# Patient Record
Sex: FEMALE | Race: WHITE | NOT HISPANIC OR LATINO
[De-identification: names, ages, dates, MRNs, and addresses within clinical notes are randomized per-mention and may not be internally consistent; named-entity substitution may affect disease eponyms.]

---

## 2018-02-09 ENCOUNTER — TRANSCRIPTION ENCOUNTER (OUTPATIENT)
Age: 43
End: 2018-02-09

## 2018-02-09 PROBLEM — Z00.00 ENCOUNTER FOR PREVENTIVE HEALTH EXAMINATION: Status: ACTIVE | Noted: 2018-02-09

## 2018-02-11 ENCOUNTER — OUTPATIENT (OUTPATIENT)
Dept: OUTPATIENT SERVICES | Facility: HOSPITAL | Age: 43
LOS: 1 days | End: 2018-02-11
Payer: COMMERCIAL

## 2018-02-11 ENCOUNTER — APPOINTMENT (OUTPATIENT)
Dept: MRI IMAGING | Facility: CLINIC | Age: 43
End: 2018-02-11
Payer: COMMERCIAL

## 2018-02-11 DIAGNOSIS — Z00.8 ENCOUNTER FOR OTHER GENERAL EXAMINATION: ICD-10-CM

## 2018-02-11 PROCEDURE — 72141 MRI NECK SPINE W/O DYE: CPT | Mod: 26

## 2018-02-11 PROCEDURE — 72141 MRI NECK SPINE W/O DYE: CPT

## 2018-09-26 ENCOUNTER — MESSAGE (OUTPATIENT)
Age: 43
End: 2018-09-26

## 2018-09-28 ENCOUNTER — APPOINTMENT (OUTPATIENT)
Dept: ORTHOPEDIC SURGERY | Facility: CLINIC | Age: 43
End: 2018-09-28
Payer: COMMERCIAL

## 2018-09-28 VITALS
WEIGHT: 128 LBS | BODY MASS INDEX: 21.85 KG/M2 | DIASTOLIC BLOOD PRESSURE: 71 MMHG | SYSTOLIC BLOOD PRESSURE: 116 MMHG | HEART RATE: 80 BPM | HEIGHT: 64 IN

## 2018-09-28 DIAGNOSIS — Z86.79 PERSONAL HISTORY OF OTHER DISEASES OF THE CIRCULATORY SYSTEM: ICD-10-CM

## 2018-09-28 DIAGNOSIS — Z82.61 FAMILY HISTORY OF ARTHRITIS: ICD-10-CM

## 2018-09-28 DIAGNOSIS — Z78.9 OTHER SPECIFIED HEALTH STATUS: ICD-10-CM

## 2018-09-28 PROCEDURE — 72040 X-RAY EXAM NECK SPINE 2-3 VW: CPT

## 2018-09-28 PROCEDURE — 99204 OFFICE O/P NEW MOD 45 MIN: CPT

## 2018-09-29 PROBLEM — Z82.61 FAMILY HISTORY OF ARTHRITIS: Status: ACTIVE | Noted: 2018-09-28

## 2020-03-02 ENCOUNTER — APPOINTMENT (OUTPATIENT)
Dept: ORTHOPEDIC SURGERY | Facility: CLINIC | Age: 45
End: 2020-03-02
Payer: COMMERCIAL

## 2020-03-02 VITALS
HEIGHT: 64 IN | BODY MASS INDEX: 21.85 KG/M2 | DIASTOLIC BLOOD PRESSURE: 80 MMHG | WEIGHT: 128 LBS | SYSTOLIC BLOOD PRESSURE: 127 MMHG | HEART RATE: 87 BPM

## 2020-03-02 PROCEDURE — 72040 X-RAY EXAM NECK SPINE 2-3 VW: CPT | Mod: 26

## 2020-03-02 PROCEDURE — 20552 NJX 1/MLT TRIGGER POINT 1/2: CPT

## 2020-03-02 PROCEDURE — 99214 OFFICE O/P EST MOD 30 MIN: CPT | Mod: 25

## 2020-03-02 RX ORDER — BUTALBITAL, ACETAMINOPHEN AND CAFFEINE 325; 50; 40 MG/1; MG/1; MG/1
50-325-40 TABLET ORAL
Qty: 10 | Refills: 0 | Status: ACTIVE | COMMUNITY
Start: 2019-12-23

## 2020-03-02 RX ORDER — RIZATRIPTAN BENZOATE 5 MG/1
5 TABLET, ORALLY DISINTEGRATING ORAL
Qty: 9 | Refills: 0 | Status: ACTIVE | COMMUNITY
Start: 2019-12-21

## 2020-03-02 RX ORDER — CYCLOBENZAPRINE HYDROCHLORIDE 5 MG/1
5 TABLET, FILM COATED ORAL
Qty: 30 | Refills: 0 | Status: ACTIVE | COMMUNITY
Start: 2019-06-29

## 2020-03-02 RX ORDER — NICOTINE POLACRILEX 4 MG
250 GUM BUCCAL
Qty: 30 | Refills: 0 | Status: ACTIVE | COMMUNITY
Start: 2019-12-21

## 2020-03-02 NOTE — HISTORY OF PRESENT ILLNESS
[de-identified] : 43yo F presents with acute exacerbation of cervicalgia with radiating pain to the LUE.  She reports the pain began 5 days ago sharp aching radiates into the left sided chest and axilla.  She reports having a hx SVT and had evaluation recently but not since having this pulling type pain to the left ACW.  Neck ROM worsens pain.  no significant relief with rest. She reports having numbness and tingling to the LUE.  She has been taking motrin intermittently.  She continues doing YOGA without significant relief.  She reports having weakness to the left UE.  She has been taking cyclobenzaprine without any relief.  [Pain Location] : pain [Worsening] : worsening [___ days] : [unfilled] day(s) ago [7] : a current pain level of 7/10

## 2020-03-02 NOTE — PHYSICAL EXAM
[de-identified] : CONSTITUTIONAL:  Patient is a very pleasant individual who is well-nourished and appears stated age.  \par \par PSYCHIATRIC:  Alert and oriented times three and in no apparent distress.\par \par HEENT:  Atraumatic and  nonsyndromic in appearance.\par \par RESPIRATORY:  Respiratory rate is regular, not dyspneic on examination.\par \par LYMPHATICS:  There is no cervical or axillary lymphadenopathy.\par  \par INTEGUMENTARY:  Skin is clean, dry, and intact about the bilateral upper extremities and cervical spine.  \par \par VASCULAR:   There is brisk capillary refill about the bilateral upper extremities and radial pulses are 2/4.  \par \par NEUROLOGIC:  Negative L'hirmitte sign. There are no pathologic reflexes. There is no decrease in sensation of the bilateral upper extremities on Wartenberg pinwheel examination.  Deep tendon reflexes are well-maintained at +2/4 of the bilateral upper extremities and are symmetric.\par \par MUSCULOSKELETAL:  There is no visible muscular atrophy.  Manual motor strength is well maintained in the bilateral upper extremities.  Cervical range of motion is well maintained.  The patient ambulates in a non-myelopathic manner.  Normal secondary orthopaedic exam of bilateral shoulders, elbows and hands.  Elbow flexion and extension, wrist extention, finger flexion and abduction are well maintained. TTP Left sided trapezius and r cervical paraspinals with reproducible pain\par \par  [de-identified] : cervical xray today with loss of cervical lordosis and cervical spondylosis c5-C6, c6-c7

## 2020-03-02 NOTE — PROCEDURE
[de-identified] : Application of trigger point injection: procedural consent of the patient prior to injection.\par The left trapezius region was prepped with Chloroprep  and anesthetize with ethyl chloride 10 mg of Depo-Medrol (methylprednisolone) & 1 1/2 mL of 1% plain lidocaine and Ketorolac 15mg  IM injection given without complication.\par The patient tolerated this well

## 2020-03-02 NOTE — DISCUSSION/SUMMARY
[de-identified] : 43yo F with acute on chronic cervicalgia, myofascial pain, cervical spondylosis.  She was provided with a trigger point injection today with depomedrol/ketorolac and lidocaine.  She was recommended to initiate conservative management with physical therapy for core strengthening modalities, decreased pain modalities and medical massage. offered  prescription of Medrol dose pack-declined, naproxen 500 p.o. q.12 p.r.n. pain. Discussed continue with home exercise/stretching to strengthen core such as yoga Pilates swimming walking.  MRI of cervical spine to evaluate for discogenic cause of LUE radiculopathy.  In regards to pain into the ACW she was recommended to f/u with PCP/cardiologist given her hx of SVT.    Followup in 3-4 weeks for repeat clinical assessment and MRI review.\par

## 2020-03-03 RX ORDER — NAPROXEN 500 MG/1
500 TABLET ORAL
Qty: 60 | Refills: 1 | Status: ACTIVE | COMMUNITY
Start: 2020-03-03 | End: 1900-01-01

## 2020-03-05 RX ORDER — METHYLPREDNISOLONE 4 MG/1
4 TABLET ORAL
Qty: 1 | Refills: 0 | Status: ACTIVE | COMMUNITY
Start: 2020-03-05 | End: 1900-01-01

## 2020-03-10 ENCOUNTER — APPOINTMENT (OUTPATIENT)
Dept: ORTHOPEDIC SURGERY | Facility: CLINIC | Age: 45
End: 2020-03-10
Payer: COMMERCIAL

## 2020-03-10 VITALS
HEIGHT: 64 IN | BODY MASS INDEX: 21.85 KG/M2 | HEART RATE: 86 BPM | DIASTOLIC BLOOD PRESSURE: 74 MMHG | WEIGHT: 128 LBS | SYSTOLIC BLOOD PRESSURE: 132 MMHG

## 2020-03-10 PROCEDURE — 99214 OFFICE O/P EST MOD 30 MIN: CPT

## 2020-03-10 NOTE — DISCUSSION/SUMMARY
[de-identified] : 43yo F with acute on chronic cervicalgia, myofascial pain, cervical spondylosis.  Patient feels as if she has exhausted conservative care and has been dealing with neck symptoms since 2012.  Feels her cervical radiculopathy is getting worse, along with her weakness in her left upper extremity. She also wishes to add in the cervical level of C4-C5 in an effort to decrease adjacent level disease and avoid more surgery. She is clearly aware that adding an additional level will not eliminate additional surgery but will postpone it. She wishes to pursue scheduling a C4-C7 ACDF. CT scans will be ordered in preparation for surgery. \par \par A long discussion was had with the patient regarding Cervical surgical plan of C4-C7 ACDF. Anatomic models, Xrays, CT scans/MRI’s were utilized to provide a firm understanding of their surgical plan. Patient is aware that surgery is elective in nature and he/she choosing to proceed with surgery. Risks, benefits, alternatives were discussed and all questions, comments and concerns were encouraged and answered to the patient's satisfaction. The statistical probability of improvement was discussed at length as well as post surgical course. Literature from [employer] spine society was provided to the patient regarding the specific type of surgery as well as a 5 page written surgical consent which the patient will need to sign and return to the office prior to surgical date. Consent forms highlight specific complications related to the complex nature of spinal surgery.\par  \par Risks of cervical surgery include: dysphagia/difficulty swallowing, Dysphonia/altered voice, adjacent segment disease (which will require more surgery in the future), vascular compromise and stroke, and persistent pain.\par  \par Benefits of cervical surgery include Improved neurologic function and pain score\par  \par We also discussed with the patient complications of incisions directly related to obesity, diabetes, previous wound complications or post-surgical wound infections, smoking, neuropathy, and chronic anticoagulation. This risk has been specifically discussed and the patient will discuss modifiable risk factors to be optimized prior to surgical management. A multimodality approach of primary care physician, and medicine subspecialists will be utilized to optimize medical risk factors.\par  \par If patient is a smoker, discontinuation of smoking was advised and must be accomplished 6-8 weeks prior to surgery date. Patient was advised that help with quitting smoking is available through [facility] Smoker's Quit [facility] and phone number/website was provided, or patient can ask assistance from primary care provider. Elective surgery will not be performed unless patient complies with this policy.\par

## 2020-03-10 NOTE — PHYSICAL EXAM
[de-identified] : CONSTITUTIONAL:  Patient is a very pleasant individual who is well-nourished and appears stated age. \par PSYCHIATRIC:  Alert and oriented times three and in no apparent distress, and participates with orthopedic evaluation well.\par HEAD:  Atraumatic and  nonsyndromic in appearance.\par EENT: No thyromegaly, EOMI.\par RESPIRATORY:  Respiratory rate is regular, not dyspneic on examination.\par LYMPHATICS:  There is no cervical or axillary lymphadenopathy.\par INTEGUMENTARY:  Skin is clean, dry, and intact about the bilateral upper extremities and cervical spine. \par VASCULAR:   There is brisk capillary refill about the bilateral upper extremities and radial pulses are 2/4. \par NEUROLOGIC:  Negative L'hirmitte, negative Spurling’s sign. There are no pathologic reflexes. There is no decrease in sensation of the bilateral upper extremities on Wartenberg pinwheel examination.  Deep tendon reflexes are well-maintained at +2/4 of the bilateral upper extremities and are symmetric.\par MUSCULOSKELETAL:  There is no visible muscular atrophy. Cervical range of motion is well maintained. The patient ambulates in a non-myelopathic manner. Normal secondary orthopaedic exam of bilateral shoulders, elbows and hands.  Elbow flexion and extension, wrist extension, finger flexion and abduction are well maintained.\par  \par  progressive motor deficits in her left triceps, sensory deficit at C6-C7/cervical radiculopathy \par  [de-identified] : MRI from [facility], cervical spine: severe C5-C6, and C6-C7 cervical spondylosis with prominent left foraminal disc herniation, also demonstrating C4-C5 DDD.\par \par Xray of the cervical spine taken today shows focal cervical kyphosis from C4-C7

## 2020-03-10 NOTE — HISTORY OF PRESENT ILLNESS
[Pain Location] : pain [Worsening] : worsening [___ days] : [unfilled] day(s) ago [7] : a current pain level of 7/10 [de-identified] : 44 year old female presents for cervical spine pain and MRI  results. She reports her left upper extremity weakness and pain has worsened at this time. Anti-inflammatory steroids and trigger point injections did not improve her pain or symptoms. Patient also feels as if she is suffering side effective of NSAIDs such as gastric upset.

## 2020-03-10 NOTE — ADDENDUM
[FreeTextEntry1] : Documented by Carissa Mackey acting as a scribe for Dr. Facundo Morley on 03/10/2020. \par \par All medical record entries made by the Scribe were at my, Dr. Facundo Morley, direction and personally dictated by me on 03/10/2020. I have reviewed the chart and agree that the record accurately reflects my personal performance of the history, physical exam, assessment and plan. I have also personally directed, reviewed, and agreed with the chart.

## 2020-03-11 RX ORDER — LIDOCAINE 5% 700 MG/1
5 PATCH TOPICAL
Qty: 5 | Refills: 0 | Status: ACTIVE | COMMUNITY
Start: 2020-03-11 | End: 1900-01-01

## 2020-03-13 RX ORDER — TRAMADOL HYDROCHLORIDE 50 MG/1
50 TABLET, COATED ORAL
Qty: 40 | Refills: 0 | Status: DISCONTINUED | COMMUNITY
Start: 2020-03-13 | End: 2020-03-13

## 2020-03-13 RX ORDER — AZITHROMYCIN 250 MG/1
250 TABLET, FILM COATED ORAL
Qty: 6 | Refills: 0 | Status: ACTIVE | COMMUNITY
Start: 2019-11-27

## 2020-03-13 RX ORDER — AMOXICILLIN AND CLAVULANATE POTASSIUM 875; 125 MG/1; MG/1
875-125 TABLET, COATED ORAL
Qty: 14 | Refills: 0 | Status: ACTIVE | COMMUNITY
Start: 2020-01-23

## 2020-03-13 RX ORDER — BENZONATATE 100 MG/1
100 CAPSULE ORAL
Qty: 20 | Refills: 0 | Status: ACTIVE | COMMUNITY
Start: 2019-11-27

## 2020-03-13 RX ORDER — TRAMADOL HYDROCHLORIDE AND ACETAMINOPHEN 37.5; 325 MG/1; MG/1
37.5-325 TABLET, FILM COATED ORAL
Qty: 15 | Refills: 0 | Status: DISCONTINUED | COMMUNITY
Start: 2020-03-11 | End: 2020-03-13

## 2020-03-13 RX ORDER — TRAMADOL HYDROCHLORIDE 50 MG/1
50 TABLET, COATED ORAL
Qty: 42 | Refills: 0 | Status: ACTIVE | COMMUNITY
Start: 2020-03-13 | End: 1900-01-01

## 2020-03-13 RX ORDER — OSELTAMIVIR PHOSPHATE 75 MG/1
75 CAPSULE ORAL
Qty: 10 | Refills: 0 | Status: ACTIVE | COMMUNITY
Start: 2020-01-06

## 2020-03-13 RX ORDER — FLUTICASONE PROPIONATE 50 UG/1
50 SPRAY, METERED NASAL
Qty: 16 | Refills: 0 | Status: ACTIVE | COMMUNITY
Start: 2020-01-23

## 2020-03-13 RX ORDER — METRONIDAZOLE 7.5 MG/G
0.75 GEL VAGINAL
Qty: 70 | Refills: 0 | Status: ACTIVE | COMMUNITY
Start: 2019-10-22

## 2020-03-13 RX ORDER — SUMATRIPTAN 25 MG/1
25 TABLET, FILM COATED ORAL
Qty: 9 | Refills: 0 | Status: ACTIVE | COMMUNITY
Start: 2019-11-19

## 2020-03-24 ENCOUNTER — APPOINTMENT (OUTPATIENT)
Dept: ORTHOPEDIC SURGERY | Facility: CLINIC | Age: 45
End: 2020-03-24

## 2020-03-27 ENCOUNTER — OUTPATIENT (OUTPATIENT)
Dept: OUTPATIENT SERVICES | Facility: HOSPITAL | Age: 45
LOS: 1 days | End: 2020-03-27
Payer: COMMERCIAL

## 2020-03-27 ENCOUNTER — APPOINTMENT (OUTPATIENT)
Dept: ORTHOPEDIC SURGERY | Facility: CLINIC | Age: 45
End: 2020-03-27

## 2020-03-27 VITALS
DIASTOLIC BLOOD PRESSURE: 69 MMHG | SYSTOLIC BLOOD PRESSURE: 126 MMHG | WEIGHT: 129.85 LBS | TEMPERATURE: 98 F | RESPIRATION RATE: 18 BRPM | HEART RATE: 96 BPM | HEIGHT: 64 IN

## 2020-03-27 DIAGNOSIS — I47.1 SUPRAVENTRICULAR TACHYCARDIA: ICD-10-CM

## 2020-03-27 DIAGNOSIS — Z29.9 ENCOUNTER FOR PROPHYLACTIC MEASURES, UNSPECIFIED: ICD-10-CM

## 2020-03-27 DIAGNOSIS — M54.12 RADICULOPATHY, CERVICAL REGION: ICD-10-CM

## 2020-03-27 DIAGNOSIS — Z01.818 ENCOUNTER FOR OTHER PREPROCEDURAL EXAMINATION: ICD-10-CM

## 2020-03-27 DIAGNOSIS — Z90.710 ACQUIRED ABSENCE OF BOTH CERVIX AND UTERUS: Chronic | ICD-10-CM

## 2020-03-27 LAB
ANION GAP SERPL CALC-SCNC: 13 MMOL/L — SIGNIFICANT CHANGE UP (ref 5–17)
APTT BLD: 29.5 SEC — SIGNIFICANT CHANGE UP (ref 27.5–36.3)
BASOPHILS # BLD AUTO: 0.03 K/UL — SIGNIFICANT CHANGE UP (ref 0–0.2)
BASOPHILS NFR BLD AUTO: 0.7 % — SIGNIFICANT CHANGE UP (ref 0–2)
BLD GP AB SCN SERPL QL: SIGNIFICANT CHANGE UP
BUN SERPL-MCNC: 7 MG/DL — LOW (ref 8–20)
CALCIUM SERPL-MCNC: 9.7 MG/DL — SIGNIFICANT CHANGE UP (ref 8.6–10.2)
CHLORIDE SERPL-SCNC: 100 MMOL/L — SIGNIFICANT CHANGE UP (ref 98–107)
CO2 SERPL-SCNC: 27 MMOL/L — SIGNIFICANT CHANGE UP (ref 22–29)
CREAT SERPL-MCNC: 0.49 MG/DL — LOW (ref 0.5–1.3)
EOSINOPHIL # BLD AUTO: 0.08 K/UL — SIGNIFICANT CHANGE UP (ref 0–0.5)
EOSINOPHIL NFR BLD AUTO: 2 % — SIGNIFICANT CHANGE UP (ref 0–6)
GLUCOSE SERPL-MCNC: 97 MG/DL — SIGNIFICANT CHANGE UP (ref 70–99)
HBA1C BLD-MCNC: 5.2 % — SIGNIFICANT CHANGE UP (ref 4–5.6)
HCT VFR BLD CALC: 40.6 % — SIGNIFICANT CHANGE UP (ref 34.5–45)
HGB BLD-MCNC: 14.3 G/DL — SIGNIFICANT CHANGE UP (ref 11.5–15.5)
IMM GRANULOCYTES NFR BLD AUTO: 0 % — SIGNIFICANT CHANGE UP (ref 0–1.5)
INR BLD: 1.04 RATIO — SIGNIFICANT CHANGE UP (ref 0.88–1.16)
LYMPHOCYTES # BLD AUTO: 1.1 K/UL — SIGNIFICANT CHANGE UP (ref 1–3.3)
LYMPHOCYTES # BLD AUTO: 27.4 % — SIGNIFICANT CHANGE UP (ref 13–44)
MCHC RBC-ENTMCNC: 33.2 PG — SIGNIFICANT CHANGE UP (ref 27–34)
MCHC RBC-ENTMCNC: 35.2 GM/DL — SIGNIFICANT CHANGE UP (ref 32–36)
MCV RBC AUTO: 94.2 FL — SIGNIFICANT CHANGE UP (ref 80–100)
MONOCYTES # BLD AUTO: 0.28 K/UL — SIGNIFICANT CHANGE UP (ref 0–0.9)
MONOCYTES NFR BLD AUTO: 7 % — SIGNIFICANT CHANGE UP (ref 2–14)
MRSA PCR RESULT.: SIGNIFICANT CHANGE UP
NEUTROPHILS # BLD AUTO: 2.53 K/UL — SIGNIFICANT CHANGE UP (ref 1.8–7.4)
NEUTROPHILS NFR BLD AUTO: 62.9 % — SIGNIFICANT CHANGE UP (ref 43–77)
PLATELET # BLD AUTO: 271 K/UL — SIGNIFICANT CHANGE UP (ref 150–400)
POTASSIUM SERPL-MCNC: 4.3 MMOL/L — SIGNIFICANT CHANGE UP (ref 3.5–5.3)
POTASSIUM SERPL-SCNC: 4.3 MMOL/L — SIGNIFICANT CHANGE UP (ref 3.5–5.3)
PROTHROM AB SERPL-ACNC: 11.8 SEC — SIGNIFICANT CHANGE UP (ref 10–12.9)
RBC # BLD: 4.31 M/UL — SIGNIFICANT CHANGE UP (ref 3.8–5.2)
RBC # FLD: 11.2 % — SIGNIFICANT CHANGE UP (ref 10.3–14.5)
S AUREUS DNA NOSE QL NAA+PROBE: SIGNIFICANT CHANGE UP
SODIUM SERPL-SCNC: 140 MMOL/L — SIGNIFICANT CHANGE UP (ref 135–145)
WBC # BLD: 4.02 K/UL — SIGNIFICANT CHANGE UP (ref 3.8–10.5)
WBC # FLD AUTO: 4.02 K/UL — SIGNIFICANT CHANGE UP (ref 3.8–10.5)

## 2020-03-27 PROCEDURE — 86850 RBC ANTIBODY SCREEN: CPT

## 2020-03-27 PROCEDURE — G0463: CPT

## 2020-03-27 PROCEDURE — 80048 BASIC METABOLIC PNL TOTAL CA: CPT

## 2020-03-27 PROCEDURE — 85610 PROTHROMBIN TIME: CPT

## 2020-03-27 PROCEDURE — 87640 STAPH A DNA AMP PROBE: CPT

## 2020-03-27 PROCEDURE — 83036 HEMOGLOBIN GLYCOSYLATED A1C: CPT

## 2020-03-27 PROCEDURE — 86901 BLOOD TYPING SEROLOGIC RH(D): CPT

## 2020-03-27 PROCEDURE — 93010 ELECTROCARDIOGRAM REPORT: CPT

## 2020-03-27 PROCEDURE — 87641 MR-STAPH DNA AMP PROBE: CPT

## 2020-03-27 PROCEDURE — 85730 THROMBOPLASTIN TIME PARTIAL: CPT

## 2020-03-27 PROCEDURE — 86900 BLOOD TYPING SEROLOGIC ABO: CPT

## 2020-03-27 PROCEDURE — 36415 COLL VENOUS BLD VENIPUNCTURE: CPT

## 2020-03-27 PROCEDURE — 85027 COMPLETE CBC AUTOMATED: CPT

## 2020-03-27 PROCEDURE — 93005 ELECTROCARDIOGRAM TRACING: CPT

## 2020-03-27 NOTE — PATIENT PROFILE ADULT - NSPROEDALEARNPREF_GEN_A_NUR
individual instruction/written material/Pre op teaching surgical scrub pain management instructions given to pt/verbal instruction

## 2020-03-27 NOTE — H&P PST ADULT - HISTORY OF PRESENT ILLNESS
44 yr old female presents with c/o pain to left shoulder left arm and occasional numbness and tingling to left hand.  Pt is right hand dominant . 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 Over the past month pain has been worse, repeat MRI done early march and surgery recommended herniation c4-c7 as per pt. Took a steroid pack for 1 week with no relief and occasional tylenol and cyclobenzaprine with little relief.

## 2020-03-27 NOTE — H&P PST ADULT - NSICDXPROBLEM_GEN_ALL_CORE_FT
PROBLEM DIAGNOSES  Problem: SVT (supraventricular tachycardia)  Assessment and Plan: cardiac clearance Dr. Chu     Problem: Cervical radiculopathy  Assessment and Plan: anterior cervical discectomy and fusion c4-c5 c5-c6 c6-c7  with DR. Morley PROBLEM DIAGNOSES  Problem: Need for prophylactic measure  Assessment and Plan: caprini score 3   surgical team assess need for dvt prophylaxis     Problem: SVT (supraventricular tachycardia)  Assessment and Plan: cardiac clearance Dr. Chu   medical clearance DR. Baum    Problem: Cervical radiculopathy  Assessment and Plan: anterior cervical discectomy and fusion c4-c5 c5-c6 c6-c7  with DR. Morley

## 2020-03-27 NOTE — H&P PST ADULT - ASSESSMENT
Pleasant 44 yr old female in NAD  presents with history of herniation to cervical spine with c/o left arm and shoulder pain . aPt scheduled for cervical fusion c5-c7 with DR. Morley .   OPIOID RISK TOOL    DURGA EACH BOX THAT APPLIES AND ADD TOTALS AT THE END    FAMILY HISTORY OF SUBSTANCE ABUSE                 FEMALE         MALE                                                Alcohol                             [  ]1 pt          [  ]3pts                                               Illegal Durgs                     [  ]2 pts        [  ]3pts                                               Rx Drugs                           [  ]4 pts        [  ]4 pts    PERSONAL HISTORY OF SUBSTANCE ABUSE                                                                                          Alcohol                             [  ]3 pts       [  ]3 pts                                               Illegal Durgs                     [  ]4 pts        [  ]4 pts                                               Rx Drugs                           [  ]5 pts        [  ]5 pts    AGE BETWEEN 16-45 YEARS                                      [ X ]1 pt         [  ]1 pt    HISTORY OF PREADOLESCENT   SEXUAL ABUSE                                                             [  ]3 pts        [  ]0pts    PSYCHOLOGICAL DISEASE                     ADD, OCD, Bipolar, Schizophrenia        [  ]2 pts         [  ]2 pts                      Depression                                               [  ]1 pt           [  ]1 pt           SCORING TOTAL   (add numbers and type here)              (*1**)                                     A score of 3 or lower indicated LOW risk for future opiod abuse  A score of 4 to 7 indicated moderate risk for future opiod abuse  A score of 8 or higher indicates a high risk for opiod abuse  CAPRINI VTE 2.0 SCORE [CLOT updated 2019]    AGE RELATED RISK FACTORS                                                       MOBILITY RELATED FACTORS  [X ] Age 41-60 years                                            (1 Point)                    [ ] Bed rest                                                        (1 Point)  [ ] Age: 61-74 years                                           (2 Points)                  [ ] Plaster cast                                                   (2 Points)  [ ] Age= 75 years                                              (3 Points)                    [ ] Bed bound for more than 72 hours                 (2 Points)    DISEASE RELATED RISK FACTORS                                               GENDER SPECIFIC FACTORS  [ ] Edema in the lower extremities                       (1 Point)              [ ] Pregnancy                                                     (1 Point)  [ ] Varicose veins                                               (1 Point)                     [ ] Post-partum < 6 weeks                                   (1 Point)             [ ] BMI > 25 Kg/m2                                            (1 Point)                     [ ] Hormonal therapy  or oral contraception          (1 Point)                 [ ] Sepsis (in the previous month)                        (1 Point)               [ ] History of pregnancy complications                 (1 point)  [ ] Pneumonia or serious lung disease                                               [ ] Unexplained or recurrent                     (1 Point)           (in the previous month)                               (1 Point)  [ ] Abnormal pulmonary function test                     (1 Point)                 SURGERY RELATED RISK FACTORS  [ ] Acute myocardial infarction                              (1 Point)               [ ]  Section                                             (1 Point)  [ ] Congestive heart failure (in the previous month)  (1 Point)      [ ] Minor surgery                                                  (1 Point)   [ ] Inflammatory bowel disease                             (1 Point)               [ ] Arthroscopic surgery                                        (2 Points)  [ ] Central venous access                                      (2 Points)                [x ] General surgery lasting more than 45 minutes (2 points)  [ ] Malignancy- Present or previous                   (2 Points)                [ ] Elective arthroplasty                                         (5 points)    [ ] Stroke (in the previous month)                          (5 Points)                                                                                                                                                           HEMATOLOGY RELATED FACTORS                                                 TRAUMA RELATED RISK FACTORS  [ ] Prior episodes of VTE                                     (3 Points)                [ ] Fracture of the hip, pelvis, or leg                       (5 Points)  [ ] Positive family history for VTE                         (3 Points)             [ ] Acute spinal cord injury (in the previous month)  (5 Points)  [ ] Prothrombin 26253 A                                     (3 Points)               [ ] Paralysis  (less than 1 month)                             (5 Points)  [ ] Factor V Leiden                                             (3 Points)                  [ ] Multiple Trauma within 1 month                        (5 Points)  [ ] Lupus anticoagulants                                     (3 Points)                                                           [ ] Anticardiolipin antibodies                               (3 Points)                                                       [ ] High homocysteine in the blood                      (3 Points)                                             [ ] Other congenital or acquired thrombophilia      (3 Points)                                                [ ] Heparin induced thrombocytopenia                  (3 Points)                                     Total Score [   3       ]

## 2020-03-27 NOTE — H&P PST ADULT - EKG AND INTERPRETATION
nsr [id number] with sinus arrythmia   biatrial enlargement   pending final interpretation   faxed to DR. Chu

## 2020-03-27 NOTE — PATIENT PROFILE ADULT - VISION (WITH CORRECTIVE LENSES IF THE PATIENT USUALLY WEARS THEM):
Partially impaired: cannot see medication labels or newsprint, but can see obstacles in path, and the surrounding layout; can count fingers at arm's length/Driving glasses

## 2020-03-27 NOTE — H&P PST ADULT - NSICDXPASTMEDICALHX_GEN_ALL_CORE_FT
PAST MEDICAL HISTORY:  Cervical spondylosis     History of radiculopathy cervical    SVT (supraventricular tachycardia) follows with DR. Cannon PAST MEDICAL HISTORY:  Cervical spondylosis     History of radiculopathy cervical    SVT (supraventricular tachycardia) follows with DR. Davis Sanchez

## 2020-03-27 NOTE — H&P PST ADULT - MUSCULOSKELETAL COMMENTS
left back shoulder , neck and left arm pain  cervical herniations cervical  herniation  left arm pain

## 2020-04-01 ENCOUNTER — NON-APPOINTMENT (OUTPATIENT)
Age: 45
End: 2020-04-01

## 2020-04-08 ENCOUNTER — APPOINTMENT (OUTPATIENT)
Dept: ORTHOPEDIC SURGERY | Facility: HOSPITAL | Age: 45
End: 2020-04-08

## 2020-04-17 ENCOUNTER — APPOINTMENT (OUTPATIENT)
Dept: ORTHOPEDIC SURGERY | Facility: CLINIC | Age: 45
End: 2020-04-17

## 2020-05-20 ENCOUNTER — OUTPATIENT (OUTPATIENT)
Dept: OUTPATIENT SERVICES | Facility: HOSPITAL | Age: 45
LOS: 1 days | End: 2020-05-20
Payer: COMMERCIAL

## 2020-05-20 VITALS
SYSTOLIC BLOOD PRESSURE: 118 MMHG | HEIGHT: 65 IN | RESPIRATION RATE: 18 BRPM | HEART RATE: 96 BPM | WEIGHT: 131.18 LBS | DIASTOLIC BLOOD PRESSURE: 69 MMHG | TEMPERATURE: 98 F

## 2020-05-20 DIAGNOSIS — Z01.818 ENCOUNTER FOR OTHER PREPROCEDURAL EXAMINATION: ICD-10-CM

## 2020-05-20 DIAGNOSIS — Z29.9 ENCOUNTER FOR PROPHYLACTIC MEASURES, UNSPECIFIED: ICD-10-CM

## 2020-05-20 DIAGNOSIS — M54.12 RADICULOPATHY, CERVICAL REGION: ICD-10-CM

## 2020-05-20 DIAGNOSIS — Z90.710 ACQUIRED ABSENCE OF BOTH CERVIX AND UTERUS: Chronic | ICD-10-CM

## 2020-05-20 DIAGNOSIS — I47.1 SUPRAVENTRICULAR TACHYCARDIA: ICD-10-CM

## 2020-05-20 PROBLEM — Z86.69 PERSONAL HISTORY OF OTHER DISEASES OF THE NERVOUS SYSTEM AND SENSE ORGANS: Chronic | Status: ACTIVE | Noted: 2020-03-27

## 2020-05-20 PROBLEM — M47.812 SPONDYLOSIS WITHOUT MYELOPATHY OR RADICULOPATHY, CERVICAL REGION: Chronic | Status: ACTIVE | Noted: 2020-03-27

## 2020-05-20 LAB
ANION GAP SERPL CALC-SCNC: 11 MMOL/L — SIGNIFICANT CHANGE UP (ref 5–17)
APTT BLD: 31.3 SEC — SIGNIFICANT CHANGE UP (ref 27.5–36.3)
BASOPHILS # BLD AUTO: 0.03 K/UL — SIGNIFICANT CHANGE UP (ref 0–0.2)
BASOPHILS NFR BLD AUTO: 0.5 % — SIGNIFICANT CHANGE UP (ref 0–2)
BLD GP AB SCN SERPL QL: SIGNIFICANT CHANGE UP
BUN SERPL-MCNC: 5 MG/DL — LOW (ref 8–20)
CALCIUM SERPL-MCNC: 9.1 MG/DL — SIGNIFICANT CHANGE UP (ref 8.6–10.2)
CHLORIDE SERPL-SCNC: 102 MMOL/L — SIGNIFICANT CHANGE UP (ref 98–107)
CO2 SERPL-SCNC: 27 MMOL/L — SIGNIFICANT CHANGE UP (ref 22–29)
CREAT SERPL-MCNC: 0.45 MG/DL — LOW (ref 0.5–1.3)
EOSINOPHIL # BLD AUTO: 0.05 K/UL — SIGNIFICANT CHANGE UP (ref 0–0.5)
EOSINOPHIL NFR BLD AUTO: 0.9 % — SIGNIFICANT CHANGE UP (ref 0–6)
GLUCOSE SERPL-MCNC: 93 MG/DL — SIGNIFICANT CHANGE UP (ref 70–99)
HCT VFR BLD CALC: 41.5 % — SIGNIFICANT CHANGE UP (ref 34.5–45)
HGB BLD-MCNC: 14.4 G/DL — SIGNIFICANT CHANGE UP (ref 11.5–15.5)
IMM GRANULOCYTES NFR BLD AUTO: 0.2 % — SIGNIFICANT CHANGE UP (ref 0–1.5)
INR BLD: 1.05 RATIO — SIGNIFICANT CHANGE UP (ref 0.88–1.16)
LYMPHOCYTES # BLD AUTO: 1.36 K/UL — SIGNIFICANT CHANGE UP (ref 1–3.3)
LYMPHOCYTES # BLD AUTO: 23.5 % — SIGNIFICANT CHANGE UP (ref 13–44)
MCHC RBC-ENTMCNC: 32.9 PG — SIGNIFICANT CHANGE UP (ref 27–34)
MCHC RBC-ENTMCNC: 34.7 GM/DL — SIGNIFICANT CHANGE UP (ref 32–36)
MCV RBC AUTO: 94.7 FL — SIGNIFICANT CHANGE UP (ref 80–100)
MONOCYTES # BLD AUTO: 0.29 K/UL — SIGNIFICANT CHANGE UP (ref 0–0.9)
MONOCYTES NFR BLD AUTO: 5 % — SIGNIFICANT CHANGE UP (ref 2–14)
MRSA PCR RESULT.: SIGNIFICANT CHANGE UP
NEUTROPHILS # BLD AUTO: 4.04 K/UL — SIGNIFICANT CHANGE UP (ref 1.8–7.4)
NEUTROPHILS NFR BLD AUTO: 69.9 % — SIGNIFICANT CHANGE UP (ref 43–77)
PLATELET # BLD AUTO: 338 K/UL — SIGNIFICANT CHANGE UP (ref 150–400)
POTASSIUM SERPL-MCNC: 4.1 MMOL/L — SIGNIFICANT CHANGE UP (ref 3.5–5.3)
POTASSIUM SERPL-SCNC: 4.1 MMOL/L — SIGNIFICANT CHANGE UP (ref 3.5–5.3)
PROTHROM AB SERPL-ACNC: 11.9 SEC — SIGNIFICANT CHANGE UP (ref 10–12.9)
RBC # BLD: 4.38 M/UL — SIGNIFICANT CHANGE UP (ref 3.8–5.2)
RBC # FLD: 11.6 % — SIGNIFICANT CHANGE UP (ref 10.3–14.5)
S AUREUS DNA NOSE QL NAA+PROBE: SIGNIFICANT CHANGE UP
SODIUM SERPL-SCNC: 140 MMOL/L — SIGNIFICANT CHANGE UP (ref 135–145)
WBC # BLD: 5.78 K/UL — SIGNIFICANT CHANGE UP (ref 3.8–10.5)
WBC # FLD AUTO: 5.78 K/UL — SIGNIFICANT CHANGE UP (ref 3.8–10.5)

## 2020-05-20 PROCEDURE — G0463: CPT

## 2020-05-20 NOTE — H&P PST ADULT - NSICDXPROBLEM_GEN_ALL_CORE_FT
PROBLEM DIAGNOSES  Problem: Radiculopathy, cervical region  Assessment and Plan: Anterior cervical discectomy and fusion C4-C5, C5-C6,C6-C7. F/U with PCP for medical clearance     Problem: SVT (supraventricular tachycardia)  Assessment and Plan: f/u with cardiologist     Problem: Need for prophylactic measure  Assessment and Plan: Moderate risk surgical team to determine prophylactic intervention

## 2020-05-20 NOTE — H&P PST ADULT - ASSESSMENT
45 y/o female presents with c/o pain to left shoulder left arm and occasional numbness and tingling to left hand.  Pt is right hand dominant, pt report progression of disease with worsen symptoms of left hand tingling, numbness, weakness and pain. Pain relieved by rest, NSAID and cyclobenzaprine. Report prior conventional treatment of physical therapy, chiropractice and steroid injections with fair efficacy with initial treatment and now pain stopped responding to conventional treatment. Seen today for a scheduled surgery with Dr. Morley. Surgery protocol reviewed with pt today. Pt to follow-up with PCP and cardiologist for clearances  CAPRINI VTE 2.0 SCORE [CLOT updated 2019]    AGE RELATED RISK FACTORS                                                       MOBILITY RELATED FACTORS  [x ] Age 41-60 years                                            (1 Point)                    [ ] Bed rest                                                        (1 Point)  [ ] Age: 61-74 years                                           (2 Points)                  [ ] Plaster cast                                                   (2 Points)  [ ] Age= 75 years                                              (3 Points)                    [ ] Bed bound for more than 72 hours                 (2 Points)    DISEASE RELATED RISK FACTORS                                               GENDER SPECIFIC FACTORS  [ ] Edema in the lower extremities                       (1 Point)              [ ] Pregnancy                                                     (1 Point)  [ ] Varicose veins                                               (1 Point)                     [ ] Post-partum < 6 weeks                                   (1 Point)             [ ] BMI > 25 Kg/m2                                            (1 Point)                     [ ] Hormonal therapy  or oral contraception          (1 Point)                 [ ] Sepsis (in the previous month)                        (1 Point)               [ ] History of pregnancy complications                 (1 point)  [ ] Pneumonia or serious lung disease                                               [ ] Unexplained or recurrent                     (1 Point)           (in the previous month)                               (1 Point)  [ ] Abnormal pulmonary function test                     (1 Point)                 SURGERY RELATED RISK FACTORS  [ ] Acute myocardial infarction                              (1 Point)               [ ]  Section                                             (1 Point)  [ ] Congestive heart failure (in the previous month)  (1 Point)      [ ] Minor surgery                                                  (1 Point)   [ ] Inflammatory bowel disease                             (1 Point)               [ ] Arthroscopic surgery                                        (2 Points)  [ ] Central venous access                                      (2 Points)                [ x] General surgery lasting more than 45 minutes (2 points)  [ ] Malignancy- Present or previous                   (2 Points)                [ ] Elective arthroplasty                                         (5 points)    [ ] Stroke (in the previous month)                          (5 Points)                                                                                                                                                           HEMATOLOGY RELATED FACTORS                                                 TRAUMA RELATED RISK FACTORS  [ ] Prior episodes of VTE                                     (3 Points)                [ ] Fracture of the hip, pelvis, or leg                       (5 Points)  [ ] Positive family history for VTE                         (3 Points)             [ ] Acute spinal cord injury (in the previous month)  (5 Points)  [ ] Prothrombin 11754 A                                     (3 Points)               [ ] Paralysis  (less than 1 month)                             (5 Points)  [ ] Factor V Leiden                                             (3 Points)                  [ ] Multiple Trauma within 1 month                        (5 Points)  [ ] Lupus anticoagulants                                     (3 Points)                                                           [ ] Anticardiolipin antibodies                               (3 Points)                                                       [ ] High homocysteine in the blood                      (3 Points)                                             [ ] Other congenital or acquired thrombophilia      (3 Points)                                                [ ] Heparin induced thrombocytopenia                  (3 Points)                                     Total Score [     3     ]  OPIOID RISK TOOL    DURGA EACH BOX THAT APPLIES AND ADD TOTALS AT THE END    FAMILY HISTORY OF SUBSTANCE ABUSE                 FEMALE         MALE                                                Alcohol                             [  ]1 pt          [  ]3pts                                               Illegal Durgs                     [  ]2 pts        [  ]3pts                                               Rx Drugs                           [  ]4 pts        [  ]4 pts    PERSONAL HISTORY OF SUBSTANCE ABUSE                                                                                          Alcohol                             [  ]3 pts       [  ]3 pts                                               Illegal Drugs                     [  ]4 pts        [  ]4 pts                                               Rx Drugs                           [  ]5 pts        [  ]5 pts    AGE BETWEEN 16-45 YEARS                                      [  ]1 pt         [  ]1 pt    HISTORY OF PREADOLESCENT   SEXUAL ABUSE                                                             [  ]3 pts        [  ]0pts    PSYCHOLOGICAL DISEASE                     ADD, OCD, Bipolar, Schizophrenia        [  ]2 pts         [  ]2 pts                      Depression                                               [  ]1 pt           [  ]1 pt           SCORING TOTAL   (add numbers and type here)              (*0**)                                     A score of 3 or lower indicated LOW risk for future opioid abuse  A score of 4 to 7 indicated moderate risk for future opioid abuse  A score of 8 or higher indicates a high risk for opioid abuse

## 2020-05-20 NOTE — ASU PATIENT PROFILE, ADULT - VISION (WITH CORRECTIVE LENSES IF THE PATIENT USUALLY WEARS THEM):
driving glasses/Normal vision: sees adequately in most situations; can see medication labels, newsprint

## 2020-05-20 NOTE — H&P PST ADULT - HISTORY OF PRESENT ILLNESS
44 yr old female presents with c/o pain to left shoulder left arm and occasional numbness and tingling to left hand.  Pt is right hand dominant . 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 Over the past month pain has been worse, repeat MRI done early march and surgery recommended herniation c4-c7 as per pt. Took a steroid pack for 1 week with no relief and occasional tylenol and cyclobenzaprine with little relief. 45 y/o female presents with c/o pain to left shoulder left arm and occasional numbness and tingling to left hand.  Pt is right hand dominant. Report progression of disease with worsen symptoms of left hand tingling, numbness and weakness. Report prior conventional treatment of physical therapy, chiropractice and steroid injections. 45 y/o female presents with c/o pain to left shoulder left arm and occasional numbness and tingling to left hand.  Pt is right hand dominant, pt report progression of disease with worsen symptoms of left hand tingling, numbness, weakness and pain. Pain relieved by rest, NSAID and cyclobenzaprine. Report prior conventional treatment of physical therapy, chiropractice and steroid injections with fair efficacy with initial treatment and now pain stopped responding to conventional treatment. Seen today for a scheduled surgery with Dr. Morley.

## 2020-05-20 NOTE — H&P PST ADULT - NSICDXPASTMEDICALHX_GEN_ALL_CORE_FT
PAST MEDICAL HISTORY:  Cervical spondylosis     History of radiculopathy cervical    SVT (supraventricular tachycardia) follows with DR. Cannon

## 2020-05-20 NOTE — H&P PST ADULT - NSHPATTENDINGPLANDISCUSS_GEN_ALL_CORE
pt with a goal of radic improvement and sagittal balance. pt aware of dysphagia, pain revision surgery etc

## 2020-05-20 NOTE — ASU PATIENT PROFILE, ADULT - LEARNING ASSESSMENT (PATIENT) ADDITIONAL COMMENTS
NP, Mehran Stahl, instructed pt on pre-op instructed pt on pre-op instructions/teaching, tips for safer surgery, pain management scale, pre-surgical infection prevention instructions, MRSA/MSSA instructions, Medical clearance and cardiac clearance pending, COVID -19 swab on monday 5/25 @ 7:30 am at [facility]. Pt verbalized understanding of all instructions given by NP.

## 2020-05-20 NOTE — H&P PST ADULT - REASON FOR ADMISSION
I have a herniation in my neck with pain " I am here for pre-testing I have a herniation in my neck with pain"

## 2020-05-22 DIAGNOSIS — Z01.818 ENCOUNTER FOR OTHER PREPROCEDURAL EXAMINATION: ICD-10-CM

## 2020-05-25 ENCOUNTER — APPOINTMENT (OUTPATIENT)
Dept: DISASTER EMERGENCY | Facility: CLINIC | Age: 45
End: 2020-05-25

## 2020-05-25 LAB — SARS-COV-2 N GENE NPH QL NAA+PROBE: NOT DETECTED

## 2020-05-26 ENCOUNTER — TRANSCRIPTION ENCOUNTER (OUTPATIENT)
Age: 45
End: 2020-05-26

## 2020-05-27 ENCOUNTER — TRANSCRIPTION ENCOUNTER (OUTPATIENT)
Age: 45
End: 2020-05-27

## 2020-05-27 ENCOUNTER — APPOINTMENT (OUTPATIENT)
Dept: ORTHOPEDIC SURGERY | Facility: HOSPITAL | Age: 45
End: 2020-05-27

## 2020-05-27 ENCOUNTER — INPATIENT (INPATIENT)
Facility: HOSPITAL | Age: 45
LOS: 0 days | Discharge: HOME | DRG: 473 | End: 2020-05-28
Attending: ORTHOPAEDIC SURGERY | Admitting: ORTHOPAEDIC SURGERY
Payer: COMMERCIAL

## 2020-05-27 VITALS
WEIGHT: 130.07 LBS | RESPIRATION RATE: 16 BRPM | SYSTOLIC BLOOD PRESSURE: 130 MMHG | TEMPERATURE: 98 F | HEART RATE: 100 BPM | HEIGHT: 64 IN | DIASTOLIC BLOOD PRESSURE: 80 MMHG | OXYGEN SATURATION: 100 %

## 2020-05-27 DIAGNOSIS — M54.12 RADICULOPATHY, CERVICAL REGION: ICD-10-CM

## 2020-05-27 DIAGNOSIS — M47.812 SPONDYLOSIS WITHOUT MYELOPATHY OR RADICULOPATHY, CERVICAL REGION: ICD-10-CM

## 2020-05-27 DIAGNOSIS — Z90.710 ACQUIRED ABSENCE OF BOTH CERVIX AND UTERUS: Chronic | ICD-10-CM

## 2020-05-27 PROCEDURE — 22551 ARTHRD ANT NTRBDY CERVICAL: CPT

## 2020-05-27 PROCEDURE — 22846 INSERT SPINE FIXATION DEVICE: CPT | Mod: AS

## 2020-05-27 PROCEDURE — 22552 ARTHRD ANT NTRBD CERVICAL EA: CPT | Mod: AS

## 2020-05-27 PROCEDURE — 22846 INSERT SPINE FIXATION DEVICE: CPT

## 2020-05-27 PROCEDURE — 22551 ARTHRD ANT NTRBDY CERVICAL: CPT | Mod: AS

## 2020-05-27 PROCEDURE — 22552 ARTHRD ANT NTRBD CERVICAL EA: CPT

## 2020-05-27 RX ORDER — SENNA PLUS 8.6 MG/1
2 TABLET ORAL AT BEDTIME
Refills: 0 | Status: DISCONTINUED | OUTPATIENT
Start: 2020-05-27 | End: 2020-05-28

## 2020-05-27 RX ORDER — ACETAMINOPHEN 500 MG
975 TABLET ORAL EVERY 6 HOURS
Refills: 0 | Status: DISCONTINUED | OUTPATIENT
Start: 2020-05-27 | End: 2020-05-28

## 2020-05-27 RX ORDER — TRAMADOL HYDROCHLORIDE 50 MG/1
50 TABLET ORAL EVERY 6 HOURS
Refills: 0 | Status: DISCONTINUED | OUTPATIENT
Start: 2020-05-27 | End: 2020-05-28

## 2020-05-27 RX ORDER — CELECOXIB 200 MG/1
200 CAPSULE ORAL AT BEDTIME
Refills: 0 | Status: DISCONTINUED | OUTPATIENT
Start: 2020-05-27 | End: 2020-05-28

## 2020-05-27 RX ORDER — DIAZEPAM 5 MG
2 TABLET ORAL EVERY 8 HOURS
Refills: 0 | Status: DISCONTINUED | OUTPATIENT
Start: 2020-05-27 | End: 2020-05-28

## 2020-05-27 RX ORDER — CYCLOBENZAPRINE HYDROCHLORIDE 10 MG/1
10 TABLET, FILM COATED ORAL EVERY 8 HOURS
Refills: 0 | Status: DISCONTINUED | OUTPATIENT
Start: 2020-05-27 | End: 2020-05-28

## 2020-05-27 RX ORDER — DEXAMETHASONE 0.5 MG/5ML
10 ELIXIR ORAL ONCE
Refills: 0 | Status: DISCONTINUED | OUTPATIENT
Start: 2020-05-27 | End: 2020-05-27

## 2020-05-27 RX ORDER — ONDANSETRON 8 MG/1
4 TABLET, FILM COATED ORAL EVERY 6 HOURS
Refills: 0 | Status: DISCONTINUED | OUTPATIENT
Start: 2020-05-27 | End: 2020-05-28

## 2020-05-27 RX ORDER — ONDANSETRON 8 MG/1
4 TABLET, FILM COATED ORAL ONCE
Refills: 0 | Status: DISCONTINUED | OUTPATIENT
Start: 2020-05-27 | End: 2020-05-27

## 2020-05-27 RX ORDER — SODIUM CHLORIDE 9 MG/ML
3 INJECTION INTRAMUSCULAR; INTRAVENOUS; SUBCUTANEOUS EVERY 8 HOURS
Refills: 0 | Status: DISCONTINUED | OUTPATIENT
Start: 2020-05-27 | End: 2020-05-27

## 2020-05-27 RX ORDER — FENTANYL CITRATE 50 UG/ML
25 INJECTION INTRAVENOUS
Refills: 0 | Status: DISCONTINUED | OUTPATIENT
Start: 2020-05-27 | End: 2020-05-27

## 2020-05-27 RX ORDER — BENZOCAINE AND MENTHOL 5; 1 G/100ML; G/100ML
1 LIQUID ORAL
Refills: 0 | Status: DISCONTINUED | OUTPATIENT
Start: 2020-05-27 | End: 2020-05-28

## 2020-05-27 RX ORDER — DEXAMETHASONE 0.5 MG/5ML
8 ELIXIR ORAL ONCE
Refills: 0 | Status: DISCONTINUED | OUTPATIENT
Start: 2020-05-27 | End: 2020-05-27

## 2020-05-27 RX ORDER — CEFAZOLIN SODIUM 1 G
2000 VIAL (EA) INJECTION ONCE
Refills: 0 | Status: DISCONTINUED | OUTPATIENT
Start: 2020-05-27 | End: 2020-05-27

## 2020-05-27 RX ORDER — CEFAZOLIN SODIUM 1 G
2000 VIAL (EA) INJECTION
Refills: 0 | Status: COMPLETED | OUTPATIENT
Start: 2020-05-27 | End: 2020-05-27

## 2020-05-27 RX ORDER — SODIUM CHLORIDE 9 MG/ML
1000 INJECTION, SOLUTION INTRAVENOUS
Refills: 0 | Status: DISCONTINUED | OUTPATIENT
Start: 2020-05-27 | End: 2020-05-28

## 2020-05-27 RX ORDER — TRAMADOL HYDROCHLORIDE 50 MG/1
25 TABLET ORAL EVERY 4 HOURS
Refills: 0 | Status: DISCONTINUED | OUTPATIENT
Start: 2020-05-27 | End: 2020-05-28

## 2020-05-27 RX ORDER — SODIUM CHLORIDE 9 MG/ML
1000 INJECTION, SOLUTION INTRAVENOUS
Refills: 0 | Status: DISCONTINUED | OUTPATIENT
Start: 2020-05-27 | End: 2020-05-27

## 2020-05-27 RX ADMIN — CELECOXIB 200 MILLIGRAM(S): 200 CAPSULE ORAL at 21:47

## 2020-05-27 RX ADMIN — CYCLOBENZAPRINE HYDROCHLORIDE 10 MILLIGRAM(S): 10 TABLET, FILM COATED ORAL at 15:30

## 2020-05-27 RX ADMIN — Medication 975 MILLIGRAM(S): at 19:09

## 2020-05-27 RX ADMIN — CYCLOBENZAPRINE HYDROCHLORIDE 10 MILLIGRAM(S): 10 TABLET, FILM COATED ORAL at 21:47

## 2020-05-27 RX ADMIN — Medication 975 MILLIGRAM(S): at 13:16

## 2020-05-27 RX ADMIN — Medication 975 MILLIGRAM(S): at 23:29

## 2020-05-27 RX ADMIN — Medication 100 MILLIGRAM(S): at 16:02

## 2020-05-27 RX ADMIN — Medication 100 MILLIGRAM(S): at 23:29

## 2020-05-27 NOTE — DISCHARGE NOTE PROVIDER - NSDCACTIVITY_GEN_ALL_CORE
Walking - Outdoors allowed/Do not drive or operate machinery/Do not make important decisions/Walking - Indoors allowed

## 2020-05-27 NOTE — DISCHARGE NOTE PROVIDER - CARE PROVIDER_API CALL
Facundo Morley  ORTHOPAEDIC SURGERY  [address] [place] [address]  Phone: [phone]  Fax: [phone]  Follow Up Time:

## 2020-05-27 NOTE — BRIEF OPERATIVE NOTE - OPERATION/FINDINGS
severe spondylosis C5-6 C6-7
Patient was maintained supine for induction of general anesthesia on a regular operating room table.  I assisted, oversaw all aspects of operative positioning including placing shoulder bolster, taping of shoulders in a slightly dependent position, maintenance of head and an extended position. I obtained fluoroscopic imaging, marked C4-C5.  I participated in operative time out.  I cleansed the operative area with Betadine and RN then prepped with DuraPrep. I participated in draping with blue drapes and ioban and then ensured that drapes were appropriately positioned. Incision was placed over operative level, small window was created in platysma.  I assisted with surgical exposure to the pretracheal prevertebral fascia using toothless pickups and then Cloward retractors during which time I assisted with maintenance of hemostasis with Surgi-Philip, Ray-Anshu, persistent suction, intermittent irrigation.   I assisted with sequential placement of Isabella pins at level and level.  I assisted with discectomy procedure at C4-C5, C5-C6, C6-C7 by using pituitary, micropituitary and suction, with osteophytectomy by using irrigation and suction and with distraction of the disc space using K2M/[employer] retractor. After placement of intervertebral spacer packed with allograft (DBM/Luanne), I assisted with plate placement over the anterior cervical spine by using Cloward retraction, intermittent irrigation, consistent suction.  I verified that screws were placed, tightened and torqued per [employer] protocol. Copious irrigation was then used, final hemostasis was performed with FloSeal, Ray-Anshu, and bipolar cautery. Fluoroscopic imaging confirmed appropriate placement of implants. I confirmed with operating surgeon and neuro monitoring that final neuro monitoring was stable.  10 round TRISH drain was placed and  Closure was performed platysma with 2-0 vicryl, superficial fascia with 3-0 vicryl and skin with 3-0 PROLINE.

## 2020-05-27 NOTE — BRIEF OPERATIVE NOTE - NSICDXBRIEFPOSTOP_GEN_ALL_CORE_FT
POST-OP DIAGNOSIS:  Cervical spondylosis with radiculopathy 27-May-2020 11:33:34  Facundo Morley
POST-OP DIAGNOSIS:  Cervical spondylosis with radiculopathy 27-May-2020 11:33:34  Facundo Morley

## 2020-05-27 NOTE — DISCHARGE NOTE PROVIDER - NSDCFUSCHEDAPPT_GEN_ALL_CORE_FT
DEBRA HARRIS ; 06/04/2020 ; [facility] [address] DEBRA HARRIS ; 06/04/2020 ; [facility] [address]

## 2020-05-27 NOTE — BRIEF OPERATIVE NOTE - NSICDXBRIEFPREOP_GEN_ALL_CORE_FT
PRE-OP DIAGNOSIS:  Cervical spondylosis with radiculopathy 27-May-2020 11:33:22  Facundo Morley  Cervical spondylosis with radiculopathy 27-May-2020 11:33:11  Facundo Morley
PRE-OP DIAGNOSIS:  Cervical spondylosis with radiculopathy 27-May-2020 11:33:22  Facundo Morley  Cervical spondylosis with radiculopathy 27-May-2020 11:33:11  Facundo Morley

## 2020-05-27 NOTE — PROGRESS NOTE ADULT - SUBJECTIVE AND OBJECTIVE BOX
DEBRA HARRIS  [id number]  45yFemale    STATUS POST:  ACDF C4-C5, C5-C6, C6-C7 for cervical stenosis and spondylosis with left upper extremity radiculitis.    Cervical radiculopathy  FH: heart disease  Family history of early CAD  Handoff  Tachycardia  Cervical spondylosis  History of radiculopathy  SVT (supraventricular tachycardia)  Cervical spondylosis with radiculopathy  Cervical spondylosis with radiculopathy  Cervical spondylosis with radiculopathy  Cervical spondylosis with radiculopathy  Cervical spondylosis  Anterior cervical discectomy with fusion  H/O: hysterectomy  RADICULOPATHY, CERVICAL REGION      SUBJECTIVE: Patient seen and examined doing well  Pain controlled, positive posterior neck pain as expected     OBJECTIVE:   T(C): 36.7 (05-27-20 @ 13:00), Max: 36.7 (05-27-20 @ 11:45)  HR: 102 (05-27-20 @ 13:00) (99 - 115)  BP: 119/62 (05-27-20 @ 13:00) (109/51 - 130/80)  RR: 15 (05-27-20 @ 13:00) (14 - 17)  SpO2: 97% (05-27-20 @ 13:00) (97% - 100%)  Constitutional: Pleasant in no acute distress  Psych:A&Ox3  EENT: no dysphonia, no dyspnea.  mild dysphagia as expected  Abdominal: soft and supple non distended  Lymphatics: no pretibial pitting edema  Spine:          Dressing:  clean/dry/intact, TRISH patent               Sensation:          Upper extremity          grossly intact manually,     distribution paresthesia on the left much improved          Lower extremity           grossly intact manually                               Motor:                   Lower and upper extremity grossly intact manually, positive posterior cervicalgia as expected            Vascular:[x] warm well perfused; capillary refill <3 seconds                A/P :45y FemaleS/P ACDF as above  POD#0  -    Pain control- patient is sensitive to pain meds in general.  Use multimodal approach to pain including celebrex or meloxicam, cyclobenzaprine, heat, ice, tylenol, and can add gabapentin 300 mg tid if needed on POD 2.  Light pain meds such as tramadol may be needed to control pain.  Avoid traditional NSAIDS which can prevent fusion.  -    DVT ppx: [ x]SCDs, early ambulation,  Pharmacolgic not necessary    -    Periop abx:  Ancef [x ]   -    Likely 23 hour admission  -    Monitor Drain Output, can discontinue drain when output equal or less than 10 cc/hr/12 hr.  Change dressing when drain pulled   -    Resume home meds as appropriate  -PT WBAT, balance and gait  -brace cervical collar with OOB is for comfort and not mandatory, never to be worn in bed, when eating or for hygeine but should be worn when in a motor vehicle for 28 days post op  -medical follow up not necessary  - patient should be reassured that it is normal to have dysphagia post operative, encourage soft diet, cutting food in small pieces.  cepacol losenges ordered

## 2020-05-27 NOTE — BRIEF OPERATIVE NOTE - NSICDXBRIEFPROCEDURE_GEN_ALL_CORE_FT
PROCEDURES:  Anterior cervical discectomy with fusion 27-May-2020 11:33:00  Facundo Morley
PROCEDURES:  Anterior cervical discectomy with fusion 27-May-2020 11:33:00  Facundo Morley

## 2020-05-27 NOTE — DISCHARGE NOTE PROVIDER - HOSPITAL COURSE
The patient was admitted for elective atnerior cervical discectomy and fusion. The post operative course was uneventful.  PT/OT Worked with patient for acute rehabilitation process. The pain was adequately controlled and patient is able to go home as she can accomplish ADL with help from family member at this time.  The cervical collar was worn for comfort when out of bed.

## 2020-05-27 NOTE — DISCHARGE NOTE PROVIDER - NSDCFUADDINST_GEN_ALL_CORE_FT
Leave occulsive dressing on wound for one week. You may then remove it and leave open to air. Protect while showering.  Leave steri strips intact, they will fall off on their own or be removed on first post op visit. Wear cervical collar when out of bed for comfort, especially when you are passenger in a car , may take off for meals & showering.  Physical therapy will be prescribed on second office visit.  For now, engage in light activity as tolerated, no lifting greater than 5 lb.  No driving while on pain meds and must wear cervical collar when in a vehicle for 28 days.

## 2020-05-27 NOTE — DISCHARGE NOTE PROVIDER - NSDCMRMEDTOKEN_GEN_ALL_CORE_FT
Advil 200 mg oral tablet:   cyclobenzaprine 5 mg oral tablet: 1 tab(s) orally 3 times a day, As Needed  Tylenol 8 HR Arthritis Pain 650 mg oral tablet, extended release: 2 tab(s) orally every 8 hours, As Needed celecoxib 200 mg oral capsule: 1 cap(s) orally once a day (at bedtime)  cyclobenzaprine 5 mg oral tablet: 1 tab(s) orally 3 times a day, As Needed  senna oral tablet: 2 tab(s) orally once a day (at bedtime), As needed, Constipation  traMADol 50 mg oral tablet: 1 tab(s) orally every 4 to 6 hours, As Needed -Pain MDD:6

## 2020-05-28 ENCOUNTER — TRANSCRIPTION ENCOUNTER (OUTPATIENT)
Age: 45
End: 2020-05-28

## 2020-05-28 VITALS
TEMPERATURE: 97 F | OXYGEN SATURATION: 100 % | DIASTOLIC BLOOD PRESSURE: 76 MMHG | HEART RATE: 91 BPM | SYSTOLIC BLOOD PRESSURE: 128 MMHG | RESPIRATION RATE: 18 BRPM

## 2020-05-28 PROCEDURE — 76000 FLUOROSCOPY <1 HR PHYS/QHP: CPT

## 2020-05-28 PROCEDURE — 97163 PT EVAL HIGH COMPLEX 45 MIN: CPT

## 2020-05-28 PROCEDURE — C1713: CPT

## 2020-05-28 PROCEDURE — C1889: CPT

## 2020-05-28 PROCEDURE — 36415 COLL VENOUS BLD VENIPUNCTURE: CPT

## 2020-05-28 RX ORDER — TRAMADOL HYDROCHLORIDE 50 MG/1
1 TABLET ORAL
Qty: 40 | Refills: 0
Start: 2020-05-28

## 2020-05-28 RX ORDER — CELECOXIB 200 MG/1
1 CAPSULE ORAL
Qty: 20 | Refills: 0
Start: 2020-05-28 | End: 2020-06-16

## 2020-05-28 RX ORDER — SENNA PLUS 8.6 MG/1
2 TABLET ORAL
Qty: 30 | Refills: 0
Start: 2020-05-28 | End: 2020-06-11

## 2020-05-28 RX ORDER — IBUPROFEN 200 MG
0 TABLET ORAL
Qty: 0 | Refills: 0 | DISCHARGE

## 2020-05-28 RX ORDER — CYCLOBENZAPRINE HYDROCHLORIDE 10 MG/1
1 TABLET, FILM COATED ORAL
Qty: 0 | Refills: 0 | DISCHARGE

## 2020-05-28 RX ORDER — ACETAMINOPHEN 500 MG
2 TABLET ORAL
Qty: 0 | Refills: 0 | DISCHARGE

## 2020-05-28 RX ORDER — CYCLOBENZAPRINE HYDROCHLORIDE 10 MG/1
1 TABLET, FILM COATED ORAL
Qty: 21 | Refills: 0
Start: 2020-05-28 | End: 2020-06-03

## 2020-05-28 RX ADMIN — CYCLOBENZAPRINE HYDROCHLORIDE 10 MILLIGRAM(S): 10 TABLET, FILM COATED ORAL at 06:13

## 2020-05-28 RX ADMIN — Medication 975 MILLIGRAM(S): at 06:13

## 2020-05-28 NOTE — PROGRESS NOTE ADULT - SUBJECTIVE AND OBJECTIVE BOX
ORTHO-SPINE POST-OP PROGRESS NOTE:      [id number]    DEBRA HARRIS      PROCEDURE: ACDF    DOS: 5/27      SUBJECTIVE: 45y Patient seen and examined. Pain controlled. Sore throat improved. C/O tingling in left 2nd fingertip as per prior. No other complaints. Patient denies of acute sensory or motor changes. Ambulated with PT and on own around room.      I&O's Detail    27 May 2020 07:01  -  28 May 2020 07:00  --------------------------------------------------------  IN:    dextrose 5% + sodium chloride 0.45%.: 1100 mL    lactated ringers.: 250 mL    Oral Fluid: 240 mL    Solution: 50 mL  Total IN: 1640 mL    OUT:    Drain: 30 mL  Total OUT: 30 mL    Total NET: 1610 mL            acetaminophen   Tablet .. 975 milliGRAM(s) Oral every 6 hours  benzocaine 15 mG/menthol 3.6 mG (Sugar-Free) Lozenge 1 Lozenge Oral every 3 hours PRN  celecoxib 200 milliGRAM(s) Oral at bedtime  cyclobenzaprine 10 milliGRAM(s) Oral every 8 hours  dextrose 5% + sodium chloride 0.45%. 1000 milliLiter(s) IV Continuous <Continuous>  diazepam    Tablet 2 milliGRAM(s) Oral every 8 hours PRN  ondansetron Injectable 4 milliGRAM(s) IV Push every 6 hours PRN  senna 2 Tablet(s) Oral at bedtime PRN  traMADol 25 milliGRAM(s) Oral every 4 hours PRN  traMADol 50 milliGRAM(s) Oral every 6 hours PRN        T(C): 36.4 (05-28-20 @ 07:40), Max: 36.8 (05-27-20 @ 19:21)  HR: 95 (05-28-20 @ 07:40) (95 - 115)  BP: 123/82 (05-28-20 @ 07:40) (109/51 - 123/82)  RR: 18 (05-28-20 @ 07:40) (14 - 19)  SpO2: 100% (05-28-20 @ 07:40) (97% - 100%)  Wt(kg): --      PHYSICAL EXAM:     Constitutional: Alert, responsive, in no acute distress.     Neck:          Dressing: Clean/dry/intact WITH DRAIN NOTED. No active bleeding or discharge noted.            Drains:  present with OUTPUT of 30cc           Skin: Wound is clean and intact. No active discharge. steri strips intact           Sensation: normal to light touch. No focal deficits noted of the extremities. Mild decreased sensation at left 2nd fingertip                  Motor exam: 5/5 BUE                                                      Vascular:  + warm well perfused; capillary refill <3 seconds                                                       A/P :  73y Female S/P   POD#     -  Pain control  -  DVT ppx: [x]SCDs     -  PT and out of bed today  -  Weight bearing status: WBAT [X]        PWB    [ ]     TTWB  [ ]      NWB  [ ]  -  Dispo: Home today after cleared by PT and medicine  - drain removed, suture tightened and dermabond applied. New mepilex dressing applied

## 2020-05-28 NOTE — DISCHARGE NOTE NURSING/CASE MANAGEMENT/SOCIAL WORK - PATIENT PORTAL LINK FT
You can access the FollowMyHealth Patient Portal offered by [facility] by registering at the following website: [email or web address]. By joining [employer]’s FollowMyHealth portal, you will also be able to view your health information using other applications (apps) compatible with our system.

## 2020-06-04 ENCOUNTER — APPOINTMENT (OUTPATIENT)
Dept: ORTHOPEDIC SURGERY | Facility: CLINIC | Age: 45
End: 2020-06-04
Payer: COMMERCIAL

## 2020-06-04 VITALS
BODY MASS INDEX: 21.85 KG/M2 | SYSTOLIC BLOOD PRESSURE: 128 MMHG | WEIGHT: 128 LBS | HEART RATE: 99 BPM | DIASTOLIC BLOOD PRESSURE: 77 MMHG | HEIGHT: 64 IN

## 2020-06-04 VITALS — TEMPERATURE: 97.4 F

## 2020-06-04 PROCEDURE — 72040 X-RAY EXAM NECK SPINE 2-3 VW: CPT

## 2020-06-04 PROCEDURE — 99024 POSTOP FOLLOW-UP VISIT: CPT

## 2020-06-04 NOTE — ADDENDUM
[FreeTextEntry1] : Documented by Hardy Juares acting as a scribe for Dr. Facundo Morley on 06/04/2020 . All medical record entries made by the Scribe were at my, Dr. Facundo Morley, direction and personally dictated by me on 06/04/2020 . I have reviewed the chart and agree that the record accurately reflects my personal performance of the history, physical exam, assessment and plan. I have also personally directed, reviewed, and agreed with the chart.

## 2020-06-04 NOTE — HISTORY OF PRESENT ILLNESS
[Clean/Dry/Intact] : clean, dry and intact [Healed] : healed [Doing Well] : is doing well [Excellent Pain Control] : has excellent pain control [No Sign of Infection] : is showing no signs of infection [Chills] : no chills [Fever] : no fever [Erythema] : not erythematous [Discharge] : absent of discharge [Swelling] : not swollen [Dehiscence] : not dehisced [de-identified] : s/p ACDF C4-C7. DOS: 5/27/2020. [de-identified] : 45 year old F presents 1 week s/p ACDF C4-C7. DOS: 5/27/2020. She is doing very well. She states neck pain is improved and UE radiculopathy is completely resolved. She states she has some mild difficulty swallowing, however it is improving and she states it is from a raspy feeling which I think is more from intubation. [de-identified] : constitutional- No acute distress\par neurologic- no LE radiculitis.\par skin- incision dry clean and intact. the incision was well healed.\par musculoskeletal - motor strength is 5/5.\par The surgical incision site(s) was clean, dry and intact and healed.\par \par Incision was cleansed with ChloraPrep and a new dressing was applied.  [de-identified] : XR's of the cervical spine show well-positioned implants/3 level ACDF. [de-identified] : s/p ACDF C4-C7. DOS: 5/27/2020. [de-identified] : 45 year old F presents s/p ACDF C4-C7. DOS: 5/27/2020. The patient is doing well at this point in time. The patient will follow up in 3 weeks for a repeat clinical assessment.

## 2020-06-25 ENCOUNTER — APPOINTMENT (OUTPATIENT)
Dept: ORTHOPEDIC SURGERY | Facility: CLINIC | Age: 45
End: 2020-06-25
Payer: COMMERCIAL

## 2020-06-25 VITALS
WEIGHT: 128 LBS | HEART RATE: 95 BPM | SYSTOLIC BLOOD PRESSURE: 106 MMHG | HEIGHT: 64 IN | DIASTOLIC BLOOD PRESSURE: 65 MMHG | BODY MASS INDEX: 21.85 KG/M2

## 2020-06-25 VITALS — TEMPERATURE: 96.1 F

## 2020-06-25 PROCEDURE — 99024 POSTOP FOLLOW-UP VISIT: CPT

## 2020-06-25 PROCEDURE — 72040 X-RAY EXAM NECK SPINE 2-3 VW: CPT

## 2020-06-25 NOTE — ADDENDUM
[FreeTextEntry1] : Documented by Hardy Juares acting as a scribe for Dr. Facundo Morley on 06/25/2020 . All medical record entries made by the Scribe were at my, Dr. Facundo Morley, direction and personally dictated by me on 06/25/2020 . I have reviewed the chart and agree that the record accurately reflects my personal performance of the history, physical exam, assessment and plan. I have also personally directed, reviewed, and agreed with the chart.

## 2020-06-25 NOTE — HISTORY OF PRESENT ILLNESS
[Clean/Dry/Intact] : clean, dry and intact [Healed] : healed [Doing Well] : is doing well [No Sign of Infection] : is showing no signs of infection [Excellent Pain Control] : has excellent pain control [Fever] : no fever [Chills] : no chills [Erythema] : not erythematous [Discharge] : absent of discharge [de-identified] : s/p ACDF C4-C7. DOS: 5/27/2020. [Dehiscence] : not dehisced [Swelling] : not swollen [de-identified] : 45 year old F presents 1 week s/p ACDF C4-C7. DOS: 5/27/2020. She is doing very well. She states neck pain is improved and UE radiculopathy is completely resolved. She still has some swallowing complaints. She c/o some very mild posterior neck discomfort which is reasonable. [de-identified] : constitutional- No acute distress\par neurologic- no LE radiculitis.\par skin- incision dry clean and intact. the incision was well healed.\par musculoskeletal - motor strength is 5/5.\par The surgical incision site(s) was clean, dry and intact and healed.\par \par some very mild posterior neck discomfort which is reasonable [de-identified] : XR's of the cervical spine show well-positioned implants / 3 level ACDF. [de-identified] : s/p ACDF C4-C7. DOS: 5/27/2020. [de-identified] : 45 year old F presents s/p ACDF C4-C7. DOS: 5/27/2020. The patient is doing well at this point in time. She is continuing to gradually improve. Pt can begin home exercises including light arm exercises, Yoga, light aerobic conditioning, etc. Pt can return to work. The patient will follow up in 6 weeks for a repeat clinical assessment.

## 2020-06-25 NOTE — HISTORY OF PRESENT ILLNESS
[Clean/Dry/Intact] : clean, dry and intact [Healed] : healed [Doing Well] : is doing well [No Sign of Infection] : is showing no signs of infection [Excellent Pain Control] : has excellent pain control [Fever] : no fever [Chills] : no chills [Erythema] : not erythematous [Discharge] : absent of discharge [Dehiscence] : not dehisced [de-identified] : s/p ACDF C4-C7. DOS: 5/27/2020. [Swelling] : not swollen [de-identified] : 45 year old F presents 1 week s/p ACDF C4-C7. DOS: 5/27/2020. She is doing very well. She states neck pain is improved and UE radiculopathy is completely resolved. She still has some swallowing complaints. She c/o some very mild posterior neck discomfort which is reasonable. [de-identified] : constitutional- No acute distress\par neurologic- no LE radiculitis.\par skin- incision dry clean and intact. the incision was well healed.\par musculoskeletal - motor strength is 5/5.\par The surgical incision site(s) was clean, dry and intact and healed.\par \par some very mild posterior neck discomfort which is reasonable [de-identified] : XR's of the cervical spine show well-positioned implants / 3 level ACDF. [de-identified] : s/p ACDF C4-C7. DOS: 5/27/2020. [de-identified] : 45 year old F presents s/p ACDF C4-C7. DOS: 5/27/2020. The patient is doing well at this point in time. She is continuing to gradually improve. Pt can begin home exercises including light arm exercises, Yoga, light aerobic conditioning, etc. Pt can return to work. The patient will follow up in 6 weeks for a repeat clinical assessment.

## 2020-06-25 NOTE — H&P PST ADULT - NECK DETAILS
Referred by: Tavia Isaacs MD; Medical Diagnosis (from order):    Diagnosis Information      Diagnosis    726.5 (ICD-9-CM) - M76.01 (ICD-10-CM) - Gluteal tendinitis of right buttock    724.2, 338.29 (ICD-9-CM) - M54.5, G89.29 (ICD-10-CM) - Chronic bilateral low back pain without sciatica                Daily Treatment Note -  Physical Therapy    Visit:  13     SUBJECTIVE                                                                                                             No LBP today.  Mild mid back pain is noted   Functional Change: Improving back strength with daily tasks.      Pain / Symptoms:  Pain rating (out of 10): Current: 2     OBJECTIVE                                                                                                                          TREATMENT                                                                                                                  Therapeutic Exercise:  10-15 reps of:  Warm up motions   Supine Segmental  Bridge roll ups  Supine Lower Trunk Rotation   CORE Stab Strengthening:  Supine Transversus Abdominis Bracing with Leg Extension   Supine Dead Bug with Leg Extension   Supine Core Bicycle   Supine Bridge with Resistance Band   Sidelying Feet Elevated Clamshells   Sidelying Hip Extension in Abduction   Quadruped Hip Abduction with Resistance Loop   Quadruped Bent Leg Hip Extension   Quadruped on Forearms Bent Knee Hip Extension Alternating   Bird Dog   Quadruped Thoracic Spine Extension   Standard Plank   Side Plank on Elbow   Prone Alternating Arm and Leg Lifts   Forward Lunge with Rotation   Standard Lunge       Home Exercise Program: (*above indicates provided as part of home exercise program)  6/16/20:    Back stretches (to do daily prn)    Knee to chest  Trunk/leg rotations  Posterior pelvic tilts  Supine Hip flexor, hamstring, and piriformis stretches  4 point cat/cow pelvic tilts, reema pose with straight or diagonal arm  jessica        6/25:  Access Code: [email or web address]   URL: [email or web address]   Warm up motions   Supine segmental Bridge roll ups  Supine Lower Trunk Rotation   Core strengthening  Supine Transversus Abdominis Bracing with Leg Extension   Supine Dead Bug with Leg Extension   Supine Core Bicycle   Supine Bridge with Resistance Band   Sidelying Feet Elevated Clamshells   Sidelying Hip Extension in Abduction   Quadruped Hip Abduction with Resistance Loop   Quadruped Bent Leg Hip Extension   Quadruped on Forearms Bent Knee Hip Extension Alternating   Bird Dog   Quadruped Thoracic Spine Extension   Standard Plank   Side Plank on Elbow   Prone Alternating Arm and Leg Lifts   Forward Lunge with Rotation   Standard Lunge         ASSESSMENT                                                                                                             Due to wrist pain full arm extension quad positioning, modification of some exercises are needed.    Pain/symptoms after session: 0       Procedures and total treatment time documented Time Entry flowsheet.     discomfort with extension , full ROM

## 2020-07-10 NOTE — H&P PST ADULT - CARDIOVASCULAR
<<<RESIDENT DISCHARGE NOTE>>>     RAMIRO BETTENCOURT  MRN-[id number]    VITAL SIGNS:  T(F): 96.9 (07-10-20 @ 10:08), Max: 97.4 (07-09-20 @ 16:00)  HR: 77 (07-10-20 @ 10:08)  BP: 132/76 (07-10-20 @ 10:08)  SpO2: --      PHYSICAL EXAMINATION:  GENERAL: NAD, well-developed, AAOx3  HEENT:  Atraumatic, Normocephalic. EOMI, PERRLA, conjunctiva and sclera clear, No JVD  PULMONARY: Clear to auscultation bilaterally; No wheeze  CARDIOVASCULAR: Regular rate and rhythm; No murmurs, rubs, or gallops  GASTROINTESTINAL: Soft, Nontender, Nondistended; Bowel sounds present  MUSCULOSKELETAL:  2+ Peripheral Pulses, ecchymoses of right lower extremity. Range of motion in lower extremities limited by pain but patient able to move them.   NEUROLOGY: non-focal  SKIN: No rashes or lesions    TEST RESULTS:                        14.2   8.36  )-----------( 158      ( 09 Jul 2020 05:37 )             41.4       07-09    136  |  99  |  18  ----------------------------<  142<H>  4.3   |  23  |  0.7    Ca    8.7      09 Jul 2020 05:37  Mg     1.9     07-09    TPro  6.3  /  Alb  3.7  /  TBili  0.4  /  DBili  x   /  AST  23  /  ALT  65<H>  /  AlkPhos  81  07-09      FINAL DISCHARGE INTERVIEW:  Resident(s) Present: (Name:Dr. Andrade), RN Present:     DISCHARGE MEDICATION RECONCILIATION  reviewed with Attending (Name:Dr. Gerard)    DISPOSITION:   [  ] Home,    [  ] Home with Visiting Nursing Services,   [    ]  SNF/ NH,    [   ] Acute Rehab (4A),   [   ] Other (Specify:_________) details… detailed exam

## 2020-08-04 ENCOUNTER — APPOINTMENT (OUTPATIENT)
Dept: OBGYN | Facility: CLINIC | Age: 45
End: 2020-08-04
Payer: COMMERCIAL

## 2020-08-04 VITALS
DIASTOLIC BLOOD PRESSURE: 65 MMHG | WEIGHT: 127 LBS | SYSTOLIC BLOOD PRESSURE: 96 MMHG | HEIGHT: 64 IN | BODY MASS INDEX: 21.68 KG/M2 | HEART RATE: 86 BPM

## 2020-08-04 DIAGNOSIS — Z86.19 PERSONAL HISTORY OF OTHER INFECTIOUS AND PARASITIC DISEASES: ICD-10-CM

## 2020-08-04 DIAGNOSIS — Z01.419 ENCOUNTER FOR GYNECOLOGICAL EXAMINATION (GENERAL) (ROUTINE) W/OUT ABNORMAL FINDINGS: ICD-10-CM

## 2020-08-04 DIAGNOSIS — Z87.19 PERSONAL HISTORY OF OTHER DISEASES OF THE DIGESTIVE SYSTEM: ICD-10-CM

## 2020-08-04 DIAGNOSIS — Z87.898 PERSONAL HISTORY OF OTHER SPECIFIED CONDITIONS: ICD-10-CM

## 2020-08-04 PROCEDURE — 99396 PREV VISIT EST AGE 40-64: CPT

## 2020-08-04 NOTE — PHYSICAL EXAM
[Awake] : awake [Mass] : no breast mass [Alert] : alert [Acute Distress] : no acute distress [Nipple Discharge] : no nipple discharge [Axillary LAD] : no axillary lymphadenopathy [Soft] : soft [Tender] : non tender [Oriented x3] : oriented to person, place, and time [Normal] : vagina [No Bleeding] : there was no active vaginal bleeding [Absent] : absent [Uterine Adnexae] : were not tender and not enlarged

## 2020-08-07 LAB — HPV HIGH+LOW RISK DNA PNL CVX: NOT DETECTED

## 2020-08-10 LAB — CYTOLOGY CVX/VAG DOC THIN PREP: NORMAL

## 2020-08-11 ENCOUNTER — APPOINTMENT (OUTPATIENT)
Dept: ORTHOPEDIC SURGERY | Facility: CLINIC | Age: 45
End: 2020-08-11
Payer: COMMERCIAL

## 2020-08-11 VITALS — TEMPERATURE: 97.1 F

## 2020-08-11 DIAGNOSIS — M54.12 RADICULOPATHY, CERVICAL REGION: ICD-10-CM

## 2020-08-11 PROCEDURE — 99024 POSTOP FOLLOW-UP VISIT: CPT

## 2020-08-11 PROCEDURE — 72040 X-RAY EXAM NECK SPINE 2-3 VW: CPT

## 2020-08-11 NOTE — ADDENDUM
[FreeTextEntry1] : Documented by Dimitry Landa acting as a scribe for Dr. Facundo Morley on 08/11/2020. All medical record entries made by the Scribe were at my, Dr. Facundo Morley, direction and personally dictated by me on 08/11/2020 . I have reviewed the chart and agree that the record accurately reflects my personal performance of the history, physical exam, assessment and plan. I have also personally directed, reviewed, and agreed with the chart.

## 2020-08-11 NOTE — HISTORY OF PRESENT ILLNESS
[Clean/Dry/Intact] : clean, dry and intact [Healed] : healed [Doing Well] : is doing well [Excellent Pain Control] : has excellent pain control [No Sign of Infection] : is showing no signs of infection [Fever] : no fever [Chills] : no chills [Discharge] : absent of discharge [Erythema] : not erythematous [Dehiscence] : not dehisced [Swelling] : not swollen [de-identified] : s/p ACDF C4-C7. DOS: 5/27/2020.  [de-identified] : 45 year old female s/p ACDF C4-C7. DOS: 5/27/2020. Patient complains of mild posterior myositis. [de-identified] : X-ray of the cervical spine taken today shows stable 3 level ACDF construct. [de-identified] : s/p ACDF C4-C7. DOS: 5/27/2020. [de-identified] : constitutional- No acute distress\par neurologic- no LE radiculitis.\par skin- incision dry clean and intact. the incision was well healed.\par musculoskeletal - motor strength is 5/5. \par \par  [de-identified] : 45 year old female s/p ACDF C4-C7. DOS: 5/27/2020. Limit lifting to 15-20 pound range. We also spoke about the benefits of using heat, ice, and Bengay cream. The patient will follow up in 2 months for a repeat clinical assessment.

## 2020-10-22 ENCOUNTER — APPOINTMENT (OUTPATIENT)
Dept: ORTHOPEDIC SURGERY | Facility: CLINIC | Age: 45
End: 2020-10-22
Payer: COMMERCIAL

## 2020-10-22 VITALS
SYSTOLIC BLOOD PRESSURE: 112 MMHG | WEIGHT: 127 LBS | TEMPERATURE: 97.3 F | BODY MASS INDEX: 21.68 KG/M2 | DIASTOLIC BLOOD PRESSURE: 74 MMHG | HEART RATE: 84 BPM | HEIGHT: 64 IN

## 2020-10-22 DIAGNOSIS — M47.812 SPONDYLOSIS W/OUT MYELOPATHY OR RADICULOPATHY, CERVICAL REGION: ICD-10-CM

## 2020-10-22 PROCEDURE — 72040 X-RAY EXAM NECK SPINE 2-3 VW: CPT

## 2020-10-22 PROCEDURE — 99214 OFFICE O/P EST MOD 30 MIN: CPT

## 2020-10-22 PROCEDURE — 99072 ADDL SUPL MATRL&STAF TM PHE: CPT

## 2020-10-22 NOTE — HISTORY OF PRESENT ILLNESS
[de-identified] : 45 year old female presents s/p ACDF C4-C7. DOS: 5/27/2020. Patients only complaint is of very mild posterior cervical muscle tightness that isn't painful. No complaints of dysphagia, tolerating food very well, patient is back to work and actively in yoga. Patient states her pre-surgical symptoms are completely resolved and her neck pain, radiating pain into the chest and UE radiculopathy have resolved. Her BP and HR have drastically improved.  [Ataxia] : no ataxia [Incontinence] : no incontinence [Loss of Dexterity] : good dexterity [Urinary Ret.] : no urinary retention

## 2020-10-22 NOTE — DISCUSSION/SUMMARY
[de-identified] : Patient to continue with workouts and aerobic conditioning, The patient will follow up in 3 months for a repeat clinical assessment.

## 2020-10-22 NOTE — ADDENDUM
[FreeTextEntry1] : Documented by Dimitry Landa acting as a scribe for Dr. Facundo Morley on 10/22/2020. All medical record entries made by the Scribe were at my, Dr. Facundo Morley, direction and personally dictated by me on 10/22/2020 . I have reviewed the chart and agree that the record accurately reflects my personal performance of the history, physical exam, assessment and plan. I have also personally directed, reviewed, and agreed with the chart. \par

## 2020-10-22 NOTE — PHYSICAL EXAM
[Poor Appearance] : well-appearing [Acute Distress] : not in acute distress [Obese] : not obese [de-identified] : CONSTITUTIONAL: Patient is a very pleasant individual who is well-nourished and appears stated age. \par PSYCHIATRIC: Alert and oriented times three and in no apparent distress, and participates with orthopedic evaluation well.\par HEAD: Atraumatic and nonsyndromic in appearance.\par EENT: No thyromegaly, EOMI.\par RESPIRATORY: Respiratory rate is regular, not dyspneic on examination.\par LYMPHATICS: There is no cervical or axillary lymphadenopathy.\par INTEGUMENTARY: Skin is clean, dry, and intact about the bilateral upper extremities and cervical spine. \par VASCULAR: There is brisk capillary refill about the bilateral upper extremities and radial pulses are 2/4. \par NEUROLOGIC: Negative L'hirmitte, negative Spurling’s sign. There are no pathologic reflexes. There is no decrease in sensation of the bilateral upper extremities on Wartenberg pinwheel examination. Deep tendon reflexes are well-maintained at +2/4 of the bilateral upper extremities and are symmetric.\par MUSCULOSKELETAL: There is no visible muscular atrophy. Manual motor strength is well maintained in the bilateral upper extremities. Cervical range of motion is well maintained. The patient ambulates in a non-myelopathic manner. Normal secondary orthopaedic exam of bilateral shoulders, elbows and hands. Elbow flexion and extension, wrist extension, finger flexion and abduction are well maintained. \par \par Incision well healed and no acute motor or sensory deficits.  [de-identified] : Xray of a cervical spine taken today demonstrates S/p 3 level ACDF.

## 2020-12-21 ENCOUNTER — APPOINTMENT (OUTPATIENT)
Dept: OBGYN | Facility: CLINIC | Age: 45
End: 2020-12-21
Payer: COMMERCIAL

## 2020-12-21 VITALS
HEIGHT: 64 IN | DIASTOLIC BLOOD PRESSURE: 74 MMHG | BODY MASS INDEX: 21.85 KG/M2 | SYSTOLIC BLOOD PRESSURE: 110 MMHG | WEIGHT: 128 LBS

## 2020-12-21 DIAGNOSIS — B37.3 CANDIDIASIS OF VULVA AND VAGINA: ICD-10-CM

## 2020-12-21 PROCEDURE — 99072 ADDL SUPL MATRL&STAF TM PHE: CPT

## 2020-12-21 PROCEDURE — 99213 OFFICE O/P EST LOW 20 MIN: CPT

## 2020-12-21 RX ORDER — TERCONAZOLE 4 MG/G
0.4 CREAM VAGINAL
Qty: 1 | Refills: 2 | Status: ACTIVE | COMMUNITY
Start: 2020-12-21 | End: 1900-01-01

## 2020-12-22 NOTE — HISTORY OF PRESENT ILLNESS
[FreeTextEntry1] : 46 yo female presenting to office with s/s of vaginal yeast infection and OTC treatment failure.

## 2020-12-22 NOTE — PLAN
[FreeTextEntry1] : Subjective Hx and Physical examination consistent with vaginal yeast infection.  Rx for Terazol 7 sent to pharmacy with direction.  All questions addressed.

## 2020-12-22 NOTE — PHYSICAL EXAM
[Normal] : normal external genitalia [Labia Majora] : normal [Labia Minora] : normal [FreeTextEntry4] : discomfort with speculum examination with signs of irritation consistent with yeast

## 2021-01-21 ENCOUNTER — APPOINTMENT (OUTPATIENT)
Dept: ORTHOPEDIC SURGERY | Facility: CLINIC | Age: 46
End: 2021-01-21
Payer: COMMERCIAL

## 2021-01-21 VITALS
BODY MASS INDEX: 21.85 KG/M2 | SYSTOLIC BLOOD PRESSURE: 126 MMHG | DIASTOLIC BLOOD PRESSURE: 73 MMHG | WEIGHT: 128 LBS | HEART RATE: 90 BPM | HEIGHT: 64 IN

## 2021-01-21 VITALS — TEMPERATURE: 98.5 F

## 2021-01-21 DIAGNOSIS — Z82.62 FAMILY HISTORY OF OSTEOPOROSIS: ICD-10-CM

## 2021-01-21 DIAGNOSIS — Z87.01 PERSONAL HISTORY OF PNEUMONIA (RECURRENT): ICD-10-CM

## 2021-01-21 DIAGNOSIS — Z80.9 FAMILY HISTORY OF MALIGNANT NEOPLASM, UNSPECIFIED: ICD-10-CM

## 2021-01-21 PROCEDURE — 99214 OFFICE O/P EST MOD 30 MIN: CPT

## 2021-01-21 PROCEDURE — 72040 X-RAY EXAM NECK SPINE 2-3 VW: CPT

## 2021-01-21 PROCEDURE — 99072 ADDL SUPL MATRL&STAF TM PHE: CPT

## 2021-01-21 NOTE — DISCUSSION/SUMMARY
[de-identified] : The patient will follow up in 4 months for her 1 year S/p clinical assessment. Patient can continue with core strengthening, home exercises, and light aerobic conditioning at this time.

## 2021-01-21 NOTE — PHYSICAL EXAM
[Poor Appearance] : well-appearing [Acute Distress] : not in acute distress [Obese] : not obese [de-identified] : CONSTITUTIONAL: Patient is a very pleasant individual who is well-nourished and appears stated age. \par PSYCHIATRIC: Alert and oriented times three and in no apparent distress, and participates with orthopedic evaluation well.\par HEAD: Atraumatic and nonsyndromic in appearance.\par EENT: No thyromegaly, EOMI.\par RESPIRATORY: Respiratory rate is regular, not dyspneic on examination.\par LYMPHATICS: There is no cervical or axillary lymphadenopathy.\par INTEGUMENTARY: Skin is clean, dry, and intact about the bilateral upper extremities and cervical spine. Well healed incisional scar. \par VASCULAR: There is brisk capillary refill about the bilateral upper extremities and radial pulses are 2/4. \par NEUROLOGIC: Negative L'hirmitte, negative Spurling’s sign. There are no pathologic reflexes. There is no decrease in sensation of the bilateral upper extremities on Wartenberg pinwheel examination. Deep tendon reflexes are well-maintained at +2/4 of the bilateral upper extremities and are symmetric.\par MUSCULOSKELETAL: There is no visible muscular atrophy. Manual motor strength is well maintained in the bilateral upper extremities. Cervical range of motion is well maintained. The patient ambulates in a non-myelopathic manner. Normal secondary orthopaedic exam of bilateral shoulders, elbows and hands. Elbow flexion and extension, wrist extension, finger flexion and abduction are well maintained. Full cervical ROM of no Posterior cervical myositis.  [de-identified] : Xray of a cervical spine taken 01/21/2021 demonstrates a well positioned 3 level ACDF.

## 2021-01-21 NOTE — ADDENDUM
[FreeTextEntry1] : Documented by Dimitry Landa acting as a scribe for Dr. Facundo Morley on 01/21/2021. All medical record entries made by the Scribe were at my, Dr. Facundo Morley, direction and personally dictated by me on 01/21/2021 . I have reviewed the chart and agree that the record accurately reflects my personal performance of the history, physical exam, assessment and plan. I have also personally directed, reviewed, and agreed with the chart.

## 2021-01-21 NOTE — HISTORY OF PRESENT ILLNESS
[de-identified] : 45 year old F Presents for follow up evaluation of 8 months S/p ACDF and states she is completely asymptomatic at this time, there is no posterior or axial neck pain and no complaints of radiculopathy. No complaints of dysphagia.  [Ataxia] : no ataxia [Incontinence] : no incontinence [Loss of Dexterity] : good dexterity [Urinary Ret.] : no urinary retention

## 2021-05-25 ENCOUNTER — APPOINTMENT (OUTPATIENT)
Dept: ORTHOPEDIC SURGERY | Facility: CLINIC | Age: 46
End: 2021-05-25
Payer: COMMERCIAL

## 2021-05-25 VITALS
DIASTOLIC BLOOD PRESSURE: 66 MMHG | HEIGHT: 64 IN | HEART RATE: 90 BPM | SYSTOLIC BLOOD PRESSURE: 112 MMHG | WEIGHT: 128 LBS | BODY MASS INDEX: 21.85 KG/M2

## 2021-05-25 DIAGNOSIS — Z87.39 PERSONAL HISTORY OF OTHER DISEASES OF THE MUSCULOSKELETAL SYSTEM AND CONNECTIVE TISSUE: ICD-10-CM

## 2021-05-25 PROCEDURE — 99213 OFFICE O/P EST LOW 20 MIN: CPT

## 2021-05-25 PROCEDURE — 72040 X-RAY EXAM NECK SPINE 2-3 VW: CPT

## 2021-05-25 PROCEDURE — 99072 ADDL SUPL MATRL&STAF TM PHE: CPT

## 2021-05-25 NOTE — PHYSICAL EXAM
[Poor Appearance] : well-appearing [Acute Distress] : not in acute distress [Obese] : not obese [de-identified] : CONSTITUTIONAL: Patient is a very pleasant individual who is well-nourished and appears stated age. \par PSYCHIATRIC: Alert and oriented times three and in no apparent distress, and participates with orthopedic evaluation well.\par HEAD: Atraumatic and nonsyndromic in appearance.\par EENT: No thyromegaly, EOMI.\par RESPIRATORY: Respiratory rate is regular, not dyspneic on examination.\par LYMPHATICS: There is no cervical or axillary lymphadenopathy.\par INTEGUMENTARY: Skin is clean, dry, and intact about the bilateral upper extremities and cervical spine. \par VASCULAR: There is brisk capillary refill about the bilateral upper extremities and radial pulses are 2/4. \par NEUROLOGIC: Negative L'hirmitte, negative Spurling’s sign. There are no pathologic reflexes. There is no decrease in sensation of the bilateral upper extremities on Wartenberg pinwheel examination. Deep tendon reflexes are well-maintained at +2/4 of the bilateral upper extremities and are symmetric.\par MUSCULOSKELETAL: There is no visible muscular atrophy. Manual motor strength is well maintained in the bilateral upper extremities. Cervical range of motion is well maintained. The patient ambulates in a non-myelopathic manner. Normal secondary orthopaedic exam of bilateral shoulders, elbows and hands. Elbow flexion and extension, wrist extension, finger flexion and abduction are well maintained.  [de-identified] : Xray of a cervical spine taken 05/25/2021 demonstrates 3 level ACDF, surgical implants appear well positioned.\par \par Xray of a cervical spine taken 01/21/2021 demonstrates a well positioned 3 level ACDF.

## 2021-05-25 NOTE — ADDENDUM
[FreeTextEntry1] : Documented by Dimitry Landa acting as a scribe for Eliza Chacon  on 08/20/2020. All medical record entries made by the Scribe were at my, Eliza Chacon , direction and personally dictated by me on 08/20/2020 . I have reviewed the chart and agree that the record accurately reflects my personal performance of the history, physical exam, assessment and plan. I have also personally directed, reviewed, and agreed with the chart.

## 2021-05-25 NOTE — HISTORY OF PRESENT ILLNESS
[de-identified] : 46 year old F Presents for follow up evaluation 1 year S/p ACDF with an acute complaint of discomfort with extreme flexion and is appreciating some clicking sensation of the joints with ROM. Pain is controlled at this time. She is please with her surgical outcome, she has no severe pain of the neck at this time. She has returned to the gym and is not taking any medication at this time.  [Ataxia] : no ataxia [Incontinence] : no incontinence [Loss of Dexterity] : good dexterity [Urinary Ret.] : no urinary retention

## 2021-05-25 NOTE — DISCUSSION/SUMMARY
[de-identified] : Anticipatory guidance regarding cervical discomfort was given. Patient to follow up in 1 year or on a PRN basis. \par \par Prior to appointment and during encounter with patient extensive medical records were reviewed including but not limited to, hospital records, out patient records, imaging results, and lab data. During this appointment the patient was examined, diagnoses were discussed and explained in a face to face manner. In addition extensive time was spent reviewing aforementioned diagnostic studies. Counseling including abnormal image results, differential diagnoses, treatment options, risk and benefits, lifestyle changes, current condition, and current medications was performed. Patient's comments, questions, and concerns were address and patient verbalized understanding. Based on this patient's presentation at our office, which is an orthopedic spine surgeon's office, this patient inherently / intrinsically has a risk, however minute, of developing  issues such as Cauda equina syndrome, bowel and bladder changes, or progression of motor or neurological deficits such as paralysis which may be permanent.\par \par \par

## 2021-08-11 ENCOUNTER — NON-APPOINTMENT (OUTPATIENT)
Age: 46
End: 2021-08-11

## 2021-08-11 ENCOUNTER — APPOINTMENT (OUTPATIENT)
Dept: OBGYN | Facility: CLINIC | Age: 46
End: 2021-08-11
Payer: COMMERCIAL

## 2021-08-11 ENCOUNTER — TRANSCRIPTION ENCOUNTER (OUTPATIENT)
Age: 46
End: 2021-08-11

## 2021-08-11 VITALS
SYSTOLIC BLOOD PRESSURE: 120 MMHG | BODY MASS INDEX: 21.85 KG/M2 | WEIGHT: 128 LBS | DIASTOLIC BLOOD PRESSURE: 60 MMHG | HEIGHT: 64 IN

## 2021-08-11 PROCEDURE — 99396 PREV VISIT EST AGE 40-64: CPT

## 2021-08-11 NOTE — PHYSICAL EXAM
[Appropriately responsive] : appropriately responsive [Alert] : alert [No Acute Distress] : no acute distress [No Lymphadenopathy] : no lymphadenopathy [No Murmurs] : no murmurs [Soft] : soft [Non-tender] : non-tender [Non-distended] : non-distended [No HSM] : No HSM [No Lesions] : no lesions [No Mass] : no mass [Oriented x3] : oriented x3 [Examination Of The Breasts] : a normal appearance [Breast Palpation Diffuse Fibrous Tissue Bilateral] : fibrocystic changes [No Masses] : no breast masses were palpable [Labia Majora] : normal [Labia Minora] : normal [Normal] : normal [Pink Rugae] : pink rugae [FreeTextEntry5] : Cervix is surgically absent [FreeTextEntry6] : Uterus and adnexa are surgically absent

## 2021-08-11 NOTE — PLAN
[FreeTextEntry1] : \par Clinical breast exam performed and significant for fibrocystic change of dense breast tissue.  Patient will have a mammogram with reflex to ultrasound.\par Pap smear not performed secondary to surgically absent cervix.  Patient was counseled on pathogenesis of HPV and current recommendations regarding Pap smears of the vaginal cuff and the rarity of vaginal cuff malignancy.\par Patient is to follow-up with her PCP, as scheduled\par All questions addressed\par Return office in 1 year, or as needed.

## 2021-08-11 NOTE — HISTORY OF PRESENT ILLNESS
[FreeTextEntry1] : 46-year-old female G3, P3 presenting office for her annual woman appointment.  She has no complaints.  Obstetrical history of 3 prior vaginal deliveries with a postdelivery complication leading to a vaginal hysterectomy in the postpartum.  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  Denies medical history.  Surgical history significant for previously mentioned vaginal hysterectomy and a recent ACDF spinal  surgery.  Family history of a maternal aunt and maternal cousin both with breast cancer.  She is BRCA negative but is vigilant with her self breast exams.  She has a history of dense breast tissue with fibrocystic change.  Denies alcohol, tobacco, illicit drug use.  She reports an allergy to sumatriptan.

## 2021-10-29 ENCOUNTER — APPOINTMENT (OUTPATIENT)
Dept: BREAST CENTER | Facility: CLINIC | Age: 46
End: 2021-10-29
Payer: COMMERCIAL

## 2021-10-29 VITALS
OXYGEN SATURATION: 99 % | BODY MASS INDEX: 21.51 KG/M2 | SYSTOLIC BLOOD PRESSURE: 118 MMHG | WEIGHT: 126 LBS | DIASTOLIC BLOOD PRESSURE: 73 MMHG | HEIGHT: 64 IN | TEMPERATURE: 98 F | HEART RATE: 83 BPM

## 2021-10-29 DIAGNOSIS — Z12.31 ENCOUNTER FOR SCREENING MAMMOGRAM FOR MALIGNANT NEOPLASM OF BREAST: ICD-10-CM

## 2021-10-29 PROCEDURE — 99204 OFFICE O/P NEW MOD 45 MIN: CPT

## 2021-10-29 NOTE — PHYSICAL EXAM
[Normocephalic] : normocephalic [Atraumatic] : atraumatic [EOMI] : extra ocular movement intact [PERRL] : pupils equal, round and reactive to light [Sclera nonicteric] : sclera nonicteric [Supple] : supple [No Supraclavicular Adenopathy] : no supraclavicular adenopathy [Examined in the supine and seated position] : examined in the supine and seated position [No dominant masses] : no dominant masses in right breast  [No dominant masses] : no dominant masses left breast [No Nipple Retraction] : no left nipple retraction [No Nipple Discharge] : no left nipple discharge [Breast Nipple Inversion] : nipples not inverted [Breast Nipple Retraction] : nipples not retracted [Breast Nipple Flattening] : nipples not flattened [Breast Nipple Fissures] : nipples not fissured [Breast Abnormal Lactation (Galactorrhea)] : no galactorrhea [Breast Abnormal Secretion Bloody Fluid] : no bloody discharge [Breast Abnormal Secretion Serous Fluid] : no serous discharge [Breast Abnormal Secretion Opalescent Fluid] : no milky discharge [No Axillary Lymphadenopathy] : no left axillary lymphadenopathy [No Edema] : no edema [No Rashes] : no rashes [No Ulceration] : no ulceration [de-identified] : No supraclavicular or axillary adenopathy. No dominant masses, normal to palpation. Everted nipple without discharge. No skin changes.\par \par  [de-identified] : No supraclavicular or axillary adenopathy. No dominant masses, normal to palpation. Everted nipple without discharge. No skin changes.\par \par

## 2021-10-29 NOTE — ASSESSMENT
[FreeTextEntry1] : 47 yo presents for abnormal skin findings.  She reports having COVID in December 2020.  She had the COVID Vaccine in April 2021.  After having the vaccine she reports left breast inframammary red line along with swelling that resolved 3 days later.  She reports this happened after the second shot.  She now states she has had neck to hip hives which she is being followed by dermatology.  She will start applying cortisone cream to the affected areas and follow up with dermatology in 3 weeks to see if this helps.  She has cut out sugar, caffeine, gluten, and has been taking antiinflammatory since the constant hives.  Mammogram and sonogram are benign.  Recommendation for:\par 1.  MR breast\par 2.  Follow up in 2 weeks to review MR breast and for clinical exam with \par 3.  Continue annual screening mammogram and sonogram\par 4.  Continue annual followup with gynecology\par 5.  Continue follow up with dermatology
verbal instruction

## 2021-11-15 DIAGNOSIS — N63.0 UNSPECIFIED LUMP IN UNSPECIFIED BREAST: ICD-10-CM

## 2021-11-15 DIAGNOSIS — L53.9 ERYTHEMATOUS CONDITION, UNSPECIFIED: ICD-10-CM

## 2021-11-29 ENCOUNTER — APPOINTMENT (OUTPATIENT)
Dept: MRI IMAGING | Facility: CLINIC | Age: 46
End: 2021-11-29

## 2021-12-01 ENCOUNTER — OUTPATIENT (OUTPATIENT)
Dept: OUTPATIENT SERVICES | Facility: HOSPITAL | Age: 46
LOS: 1 days | End: 2021-12-01
Payer: COMMERCIAL

## 2021-12-01 ENCOUNTER — APPOINTMENT (OUTPATIENT)
Dept: MRI IMAGING | Facility: CLINIC | Age: 46
End: 2021-12-01
Payer: COMMERCIAL

## 2021-12-01 DIAGNOSIS — N63.0 UNSPECIFIED LUMP IN UNSPECIFIED BREAST: ICD-10-CM

## 2021-12-01 DIAGNOSIS — Z90.710 ACQUIRED ABSENCE OF BOTH CERVIX AND UTERUS: Chronic | ICD-10-CM

## 2021-12-01 PROCEDURE — A9585: CPT

## 2021-12-01 PROCEDURE — C8937: CPT

## 2021-12-01 PROCEDURE — C8908: CPT

## 2021-12-01 PROCEDURE — 77049 MRI BREAST C-+ W/CAD BI: CPT | Mod: 26

## 2021-12-03 ENCOUNTER — APPOINTMENT (OUTPATIENT)
Dept: BREAST CENTER | Facility: CLINIC | Age: 46
End: 2021-12-03

## 2021-12-09 ENCOUNTER — RESULT REVIEW (OUTPATIENT)
Age: 46
End: 2021-12-09

## 2021-12-09 ENCOUNTER — OUTPATIENT (OUTPATIENT)
Dept: OUTPATIENT SERVICES | Facility: HOSPITAL | Age: 46
LOS: 1 days | End: 2021-12-09
Payer: COMMERCIAL

## 2021-12-09 ENCOUNTER — APPOINTMENT (OUTPATIENT)
Dept: MRI IMAGING | Facility: CLINIC | Age: 46
End: 2021-12-09
Payer: COMMERCIAL

## 2021-12-09 DIAGNOSIS — Z00.8 ENCOUNTER FOR OTHER GENERAL EXAMINATION: ICD-10-CM

## 2021-12-09 DIAGNOSIS — Z90.710 ACQUIRED ABSENCE OF BOTH CERVIX AND UTERUS: Chronic | ICD-10-CM

## 2021-12-09 DIAGNOSIS — R93.89 ABNORMAL FINDINGS ON DIAGNOSTIC IMAGING OF OTHER SPECIFIED BODY STRUCTURES: ICD-10-CM

## 2021-12-09 PROCEDURE — 77065 DX MAMMO INCL CAD UNI: CPT

## 2021-12-09 PROCEDURE — 19085 BX BREAST 1ST LESION MR IMAG: CPT | Mod: LT

## 2021-12-09 PROCEDURE — A4648: CPT

## 2021-12-09 PROCEDURE — 88305 TISSUE EXAM BY PATHOLOGIST: CPT | Mod: 26

## 2021-12-09 PROCEDURE — 88305 TISSUE EXAM BY PATHOLOGIST: CPT

## 2021-12-09 PROCEDURE — 77065 DX MAMMO INCL CAD UNI: CPT | Mod: 26,LT

## 2021-12-09 PROCEDURE — 19085 BX BREAST 1ST LESION MR IMAG: CPT

## 2021-12-09 PROCEDURE — A9585: CPT

## 2021-12-13 ENCOUNTER — NON-APPOINTMENT (OUTPATIENT)
Age: 46
End: 2021-12-13

## 2022-05-31 ENCOUNTER — APPOINTMENT (OUTPATIENT)
Dept: ORTHOPEDIC SURGERY | Facility: CLINIC | Age: 47
End: 2022-05-31
Payer: COMMERCIAL

## 2022-05-31 VITALS
HEIGHT: 64 IN | SYSTOLIC BLOOD PRESSURE: 130 MMHG | HEART RATE: 89 BPM | WEIGHT: 128 LBS | DIASTOLIC BLOOD PRESSURE: 75 MMHG | BODY MASS INDEX: 21.85 KG/M2

## 2022-05-31 DIAGNOSIS — M79.18 MYALGIA, OTHER SITE: ICD-10-CM

## 2022-05-31 DIAGNOSIS — Z98.1 ARTHRODESIS STATUS: ICD-10-CM

## 2022-05-31 PROCEDURE — 72040 X-RAY EXAM NECK SPINE 2-3 VW: CPT

## 2022-05-31 PROCEDURE — 20552 NJX 1/MLT TRIGGER POINT 1/2: CPT

## 2022-05-31 PROCEDURE — 99213 OFFICE O/P EST LOW 20 MIN: CPT | Mod: 25

## 2022-05-31 NOTE — PROCEDURE
[de-identified] : Verbal consent was obtained and all questions, comments and concerns were addressed. After trigger point in the affected left posterior cervical area was cleansed with alcohol prep X 3, ethyl chloride was used as local anesthetic. Under sterile precautions 1cc of 1% lidocaine without epinephrine, 30mg toradol,  plus 10 mg depomedrol, 30mg Toradol, were instilled into the affected left posterior trigger points. Patient tolerated procedure well. Dry sterile dressing was placed and patient described relief of pain 5 minutes after procedure was performed.

## 2022-05-31 NOTE — PHYSICAL EXAM
[Poor Appearance] : well-appearing [Acute Distress] : not in acute distress [Obese] : not obese [de-identified] : CONSTITUTIONAL: Patient is a very pleasant individual who is well-nourished and appears stated age. \par PSYCHIATRIC: Alert and oriented times three and in no apparent distress, and participates with orthopedic evaluation well.\par HEAD: Atraumatic and nonsyndromic in appearance.\par EENT: No thyromegaly, EOMI.\par RESPIRATORY: Respiratory rate is regular, not dyspneic on examination.\par LYMPHATICS: There is no cervical or axillary lymphadenopathy.\par INTEGUMENTARY: Skin is clean, dry, and intact about the bilateral upper extremities and cervical spine. \par VASCULAR: There is brisk capillary refill about the bilateral upper extremities and radial pulses are 2/4. \par NEUROLOGIC: Negative L'hirmitte, negative Spurling’s sign. There are no pathologic reflexes. There is no decrease in sensation of the bilateral upper extremities on Wartenberg pinwheel examination. Deep tendon reflexes are well-maintained at +2/4 of the bilateral upper extremities and are symmetric.\par MUSCULOSKELETAL: There is no visible muscular atrophy. Manual motor strength is well maintained in the bilateral upper extremities. Cervical range of motion is well maintained. The patient ambulates in a non-myelopathic manner. Normal secondary orthopaedic exam of bilateral shoulders, elbows and hands. Elbow flexion and extension, wrist extension, finger flexion and abduction are well maintained. Posterior cervical myositis.  [de-identified] : AP and lateral images of a cervical spine taken 05/25/2021 demonstrates 3 level ACDF, surgical implants appear well positioned.\par \par AP and lateral images of a cervical spine taken 01/21/2021 demonstrates a well positioned 3 level ACDF.\par \par AP and Lateral views of the cervical spine taken 05/31/2022 demonstrates Sp 3 level ACDF

## 2022-05-31 NOTE — DISCUSSION/SUMMARY
[Medication Risks Reviewed] : Medication risks reviewed [de-identified] : We talked about the nature of the condition and treatment options. Anticipatory guidance regarding Posterior cervical myositis was given. I have recommended that the patient continue with a conservative treatment plan. Patient has been referred to physical therapy for decreased pain modalities, cervical stretching, soft tissue modalities, and physical modalities. We also spoke about the benefits of using heat, application of ice to the area 2-3x daily for 20 minutes after periods of activity, and Bengay cream. Patient tolerated an injection well in office today as detailed above. The patient will follow up in 1 year for a repeat clinical assessment regarding her ACDF. \par \par Prior to appointment and during encounter with patient extensive medical records were reviewed including but not limited to, hospital records, out patient records, imaging results, and lab data. During this appointment the patient was examined, diagnoses were discussed and explained in a face to face manner. In addition extensive time was spent reviewing aforementioned diagnostic studies. Counseling including abnormal image results, differential diagnoses, treatment options, risk and benefits, lifestyle changes, current condition, and current medications was performed. Patient's comments, questions, and concerns were address and patient verbalized understanding. Based on this patient's presentation at our office, which is an orthopedic spine surgeon's office, this patient inherently / intrinsically has a risk, however minute, of developing  issues such as Cauda equina syndrome, bowel and bladder changes, or progression of motor or neurological deficits such as paralysis which may be permanent.

## 2022-05-31 NOTE — ADDENDUM
[FreeTextEntry1] : Documented by Dimitry Landa acting as a scribe for Dr. Facundo Morley on 05/31/2022. All medical record entries made by the Scribe were at my, Dr. Facundo Morley, direction and personally dictated by me on 05/31/2022 . I have reviewed the chart and agree that the record accurately reflects my personal performance of the history, physical exam, assessment and plan. I have also personally directed, reviewed, and agreed with the chart.

## 2022-05-31 NOTE — HISTORY OF PRESENT ILLNESS
[de-identified] : 47 year old F Presents for follow up evaluation of an acute exacerbation of chronic neck pain, radiating up the left posterior scalp into her ear. She states this pain does not limit her ADLs however it is bothersome. She continues to attend hot yoga sessions. No pain, tingling, or numbness. ELI questionnaire negative. \par \par Patient is 2 years S/p ACDF.  [Ataxia] : no ataxia [Incontinence] : no incontinence [Loss of Dexterity] : good dexterity [Urinary Ret.] : no urinary retention

## 2022-08-15 ENCOUNTER — APPOINTMENT (OUTPATIENT)
Dept: OBGYN | Facility: CLINIC | Age: 47
End: 2022-08-15

## 2022-08-15 VITALS
BODY MASS INDEX: 21.34 KG/M2 | HEIGHT: 64 IN | WEIGHT: 125 LBS | SYSTOLIC BLOOD PRESSURE: 118 MMHG | DIASTOLIC BLOOD PRESSURE: 76 MMHG

## 2022-08-15 DIAGNOSIS — Z90.710 ACQUIRED ABSENCE OF BOTH CERVIX AND UTERUS: ICD-10-CM

## 2022-08-15 PROCEDURE — 99396 PREV VISIT EST AGE 40-64: CPT

## 2022-08-15 NOTE — PHYSICAL EXAM
[Appropriately responsive] : appropriately responsive [Alert] : alert [No Acute Distress] : no acute distress [Soft] : soft [Non-tender] : non-tender [Non-distended] : non-distended [No HSM] : No HSM [No Lesions] : no lesions [No Mass] : no mass [Oriented x3] : oriented x3 [Examination Of The Breasts] : a normal appearance [Breast Palpation Diffuse Fibrous Tissue Bilateral] : fibrocystic changes [Normal] : normal [No Masses] : no breast masses were palpable

## 2022-08-15 NOTE — HISTORY OF PRESENT ILLNESS
[FreeTextEntry1] : 47-year-old female  presenting office for annual well woman appointment.  She has no complaints and no change in her history.\par \par Obstetrical history of 3 prior vaginal deliveries with a postdelivery complication leading to a vaginal hysterectomy at 3 months postpartum.  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  Denies medical history.  Surgical history significant for previously mentioned vaginal hysterectomy and cervical spinal fusion surgery.  Family history of a maternal aunt and maternal cousin both with breast cancer.  She is BRCA negative but is vigilant with her self breast exams.  She has a history of dense breast tissue with fibrocystic change.  She underwent a MRI breast biopsy with Lesli Pena M.D., which resulted benign with recommendation for follow-up for MRI and sonogram in 1 years time.  These orders are placed in the system already.  Denies alcohol, tobacco, illicit drug use.  She reports an allergy to sumatriptan.

## 2022-08-15 NOTE — PLAN
[FreeTextEntry1] : \par Clinical breast exam performed and significant for fibrocystic breast changes and self breast exam discussed.  Patient has an MRI/sonogram scheduled as per Lesli Pena M.D.\par Patient has no pelvic complaints and ASCCP guidelines were discussed at length including indications for Pap smear of the vaginal cuff.\par Patient positive PCP and has regular appointments\par All questions addressed\par She may return to office in 1 years time, or as needed.

## 2022-08-15 NOTE — COUNSELING
[Nutrition/ Exercise/ Weight Management] : nutrition, exercise, weight management [Vitamins/Supplements] : vitamins/supplements [Breast Self Exam] : breast self exam [HPV Vaccine] : HPV Vaccine

## 2022-08-22 ENCOUNTER — APPOINTMENT (OUTPATIENT)
Dept: MAMMOGRAPHY | Facility: CLINIC | Age: 47
End: 2022-08-22

## 2022-08-22 ENCOUNTER — APPOINTMENT (OUTPATIENT)
Dept: ULTRASOUND IMAGING | Facility: CLINIC | Age: 47
End: 2022-08-22

## 2022-11-14 ENCOUNTER — RX ONLY (RX ONLY)
Age: 47
End: 2022-11-14

## 2022-11-14 ENCOUNTER — OFFICE (OUTPATIENT)
Dept: URBAN - METROPOLITAN AREA CLINIC 113 | Facility: CLINIC | Age: 47
Setting detail: OPHTHALMOLOGY
End: 2022-11-14
Payer: COMMERCIAL

## 2022-11-14 ENCOUNTER — APPOINTMENT (OUTPATIENT)
Dept: SURGERY | Facility: CLINIC | Age: 47
End: 2022-11-14

## 2022-11-14 DIAGNOSIS — H18.523: ICD-10-CM

## 2022-11-14 PROCEDURE — 99213 OFFICE O/P EST LOW 20 MIN: CPT | Performed by: OPTOMETRIST

## 2022-11-14 ASSESSMENT — KERATOMETRY
OS_AXISANGLE_DEGREES: 100
OS_K1POWER_DIOPTERS: 43.50
OS_K2POWER_DIOPTERS: 44.50
OD_AXISANGLE_DEGREES: 071
OD_K1POWER_DIOPTERS: 43.00
OD_K2POWER_DIOPTERS: 46.50

## 2022-11-14 ASSESSMENT — REFRACTION_AUTOREFRACTION
OD_AXIS: 170
OD_SPHERE: -1.75
OS_SPHERE: PLANO
OS_CYLINDER: -0.75
OS_AXIS: 012
OD_CYLINDER: -3.50

## 2022-11-14 ASSESSMENT — SPHEQUIV_DERIVED
OS_SPHEQUIV: -0.5
OD_SPHEQUIV: -3.5
OD_SPHEQUIV: -2.5

## 2022-11-14 ASSESSMENT — REFRACTION_CURRENTRX
OD_ADD: +0.75
OS_AXIS: 176
OS_OVR_VA: 20/
OD_SPHERE: -1.00
OS_AXIS: 180
OD_SPHERE: -1.00
OD_CYLINDER: -1.50
OS_SPHERE: -0.25
OD_AXIS: 177
OD_OVR_VA: 20/
OD_VPRISM_DIRECTION: PROGS
OD_AXIS: 177
OD_VPRISM_DIRECTION: SV
OS_CYLINDER: -0.25
OS_CYLINDER: -0.50
OD_OVR_VA: 20/
OS_SPHERE: -0.25
OS_ADD: +0.75
OD_CYLINDER: -1.50
OS_VPRISM_DIRECTION: PROGS
OS_OVR_VA: 20/
OS_VPRISM_DIRECTION: SV

## 2022-11-14 ASSESSMENT — AXIALLENGTH_DERIVED
OD_AL: 24.5324
OD_AL: 24.1183
OS_AL: 23.6031

## 2022-11-14 ASSESSMENT — VISUAL ACUITY
OD_BCVA: 20/20
OS_BCVA: 20/30-1

## 2022-11-14 ASSESSMENT — REFRACTION_MANIFEST
OS_SPHERE: -0.25
OS_ADD: +1.00
OS_AXIS: 005
OD_CYLINDER: -3.00
OS_VA1: 20/20
OD_AXIS: 172
OD_VA1: 20/30+
OS_CYLINDER: -0.50
OD_ADD: +1.00
OD_SPHERE: -1.00

## 2022-11-14 ASSESSMENT — CORNEAL SURGICAL SCARRING: OD_SCARRING: CENTRAL

## 2022-11-14 ASSESSMENT — CONFRONTATIONAL VISUAL FIELD TEST (CVF)
OD_FINDINGS: FULL
OS_FINDINGS: FULL

## 2022-11-15 PROBLEM — H52.223 ASTIGMATISM; BOTH EYES: Status: ACTIVE | Noted: 2022-11-14

## 2023-01-07 ENCOUNTER — OFFICE (OUTPATIENT)
Dept: URBAN - METROPOLITAN AREA CLINIC 104 | Facility: CLINIC | Age: 48
Setting detail: OPHTHALMOLOGY
End: 2023-01-07
Payer: COMMERCIAL

## 2023-01-07 DIAGNOSIS — H52.213: ICD-10-CM

## 2023-01-07 DIAGNOSIS — H18.523: ICD-10-CM

## 2023-01-07 DIAGNOSIS — H40.013: ICD-10-CM

## 2023-01-07 PROCEDURE — 92014 COMPRE OPH EXAM EST PT 1/>: CPT | Performed by: OPHTHALMOLOGY

## 2023-01-07 PROCEDURE — 92025 CPTRIZED CORNEAL TOPOGRAPHY: CPT | Performed by: OPHTHALMOLOGY

## 2023-01-07 PROCEDURE — 76514 ECHO EXAM OF EYE THICKNESS: CPT | Performed by: OPHTHALMOLOGY

## 2023-01-07 ASSESSMENT — PACHYMETRY
OS_CT_CORRECTION: -1
OD_CT_CORRECTION: -3
OS_CT_UM: 557
OD_CT_UM: 585

## 2023-01-07 ASSESSMENT — REFRACTION_CURRENTRX
OS_OVR_VA: 20/
OS_AXIS: 0
OD_VPRISM_DIRECTION: PROGS
OS_OVR_VA: 20/
OD_AXIS: 172
OS_ADD: +1.00
OD_SPHERE: -1.00
OD_OVR_VA: 20/
OS_SPHERE: -0.25
OD_SPHERE: -1.00
OD_OVR_VA: 20/
OD_VPRISM_DIRECTION: SV
OD_CYLINDER: -1.50
OD_CYLINDER: -3.00
OS_CYLINDER: -0.50
OS_VPRISM_DIRECTION: SV
OS_AXIS: 180
OD_AXIS: 177
OS_VPRISM_DIRECTION: PROGS
OS_CYLINDER: -0.50
OS_SPHERE: -0.25
OD_ADD: +1.00

## 2023-01-07 ASSESSMENT — CONFRONTATIONAL VISUAL FIELD TEST (CVF)
OD_FINDINGS: FULL
OS_FINDINGS: FULL

## 2023-01-07 ASSESSMENT — VISUAL ACUITY
OS_BCVA: 20/30-1
OD_BCVA: 20/20

## 2023-01-07 ASSESSMENT — KERATOMETRY
OD_AXISANGLE_DEGREES: 84
OD_K1POWER_DIOPTERS: 43.83
OS_AXISANGLE_DEGREES: 100
OD_K2POWER_DIOPTERS: 45.79
OS_K1POWER_DIOPTERS: 43.44
OS_K2POWER_DIOPTERS: 44.58

## 2023-01-07 ASSESSMENT — SPHEQUIV_DERIVED
OD_SPHEQUIV: -3.25
OS_SPHEQUIV: -0.625
OD_SPHEQUIV: -4
OS_SPHEQUIV: -0.5

## 2023-01-07 ASSESSMENT — REFRACTION_MANIFEST
OS_AXIS: 010
OD_SPHERE: -1.75
OS_ADD: +1.00
OD_CYLINDER: -3.00
OD_ADD: +1.00
OS_SPHERE: -0.25
OD_AXIS: 172
OS_CYLINDER: -0.50
OS_VA1: 20/20
OD_VA1: 20/30+

## 2023-01-07 ASSESSMENT — AXIALLENGTH_DERIVED
OS_AL: 23.6
OD_AL: 24.72
OS_AL: 23.65
OD_AL: 24.4

## 2023-01-07 ASSESSMENT — REFRACTION_AUTOREFRACTION
OS_SPHERE: -0.25
OD_AXIS: 172
OD_CYLINDER: -3.00
OD_SPHERE: -2.50
OS_CYLINDER: -0.75
OS_AXIS: 11

## 2023-01-07 ASSESSMENT — CORNEAL SURGICAL SCARRING: OD_SCARRING: CENTRAL

## 2023-03-08 ENCOUNTER — APPOINTMENT (OUTPATIENT)
Dept: SURGERY | Facility: CLINIC | Age: 48
End: 2023-03-08
Payer: COMMERCIAL

## 2023-03-08 VITALS
DIASTOLIC BLOOD PRESSURE: 70 MMHG | HEIGHT: 64 IN | SYSTOLIC BLOOD PRESSURE: 118 MMHG | BODY MASS INDEX: 21.34 KG/M2 | WEIGHT: 125 LBS

## 2023-03-08 DIAGNOSIS — R92.2 INCONCLUSIVE MAMMOGRAM: ICD-10-CM

## 2023-03-08 DIAGNOSIS — R93.89 ABNORMAL FINDINGS ON DIAGNOSTIC IMAGING OF OTHER SPECIFIED BODY STRUCTURES: ICD-10-CM

## 2023-03-08 PROCEDURE — 99213 OFFICE O/P EST LOW 20 MIN: CPT

## 2023-03-08 NOTE — HISTORY OF PRESENT ILLNESS
[FreeTextEntry1] : Problem List:\par 1.Left breast fibroadenoma/PASH\par 2. Family history of breast cancer \par     -BRCA testing over 15 years ago, negative\par \par \par CARE TEAM\par PCP - Eckberg\par GYN - Egner\par \par \par INTERVAL HISTORY:\par Patient returns for followup after prior abnormal breast imaging in December 2021, with recommendations for repeat MRI, for which she is overdue. She denies breast complaints. \par \par \par HPI: \par Patient initially presented to Dr. Pena in October 2021 for local breast skin reaction. This was being followed by a dermatologist and has improved. However, she underwent MRI for further eval which demonstrated a left breast enhancement for which biopsy was recommended. Imaging revealing fibroadenoma and PASH. \par \par \par IMAGING:\par 08/23/22 - [facility]: Bilateral Screening Mammogram - IMPRESSION: No mammographic evidence of malignancy. Breast sonography has been performed and will be reported separately. - BIRADS 2: Benign Finding(s).\par \par 08/23/22 - [facility]: Complete Bilateral Breast Ultrasound - IMPRESSION: Right breast nodule with benign features, stable. No suspicious findings. - BIRADS 2: Benign Finding(s).\par \par \par

## 2023-03-08 NOTE — ASSESSMENT
[FreeTextEntry1] : 47 year old female with history of left breast MRI-biopsy revealing PASH and fibroadenoma. Repeat MRI in one year was recommended. \par \par She is overdue for repeat MRI. She was given a script for this today, and I will call her with results. She will be due for mammogram in August 2023. I will see her back in 6 months for her next CBE. \par \par Given her family history of breast cancer she does meet NCCN criteria for genetic testing, and is a candidate for extended panel testing. We discussed the possibility of a positive or negative result as well as a variant of uncertain significance. We discussed the clinical implications of a positive result on future screening for her and her family members. She was given a referral to our genetic counselors to discuss in further detail.\par \par \par \par PLAN\par 1.  MR breast now\par 2. Mammogram/US August 2023\par 3. Genetics\par 4. Followup in 6 months

## 2023-03-08 NOTE — PHYSICAL EXAM
[Normocephalic] : normocephalic [Atraumatic] : atraumatic [EOMI] : extra ocular movement intact [PERRL] : pupils equal, round and reactive to light [Sclera nonicteric] : sclera nonicteric [Supple] : supple [No Supraclavicular Adenopathy] : no supraclavicular adenopathy [Examined in the supine and seated position] : examined in the supine and seated position [No dominant masses] : no dominant masses in right breast  [No dominant masses] : no dominant masses left breast [No Nipple Retraction] : no left nipple retraction [No Nipple Discharge] : no left nipple discharge [No Axillary Lymphadenopathy] : no left axillary lymphadenopathy [No Edema] : no edema [No Rashes] : no rashes [No Ulceration] : no ulceration [No Cervical Adenopathy] : no cervical adenopathy [Bra Size: ___] : Bra Size: [unfilled] [Breast Nipple Inversion] : nipples not inverted [Breast Nipple Retraction] : nipples not retracted [Breast Nipple Flattening] : nipples not flattened [Breast Nipple Fissures] : nipples not fissured [Breast Abnormal Lactation (Galactorrhea)] : no galactorrhea [Breast Abnormal Secretion Bloody Fluid] : no bloody discharge [Breast Abnormal Secretion Serous Fluid] : no serous discharge [Breast Abnormal Secretion Opalescent Fluid] : no milky discharge [de-identified] : \par \par  [de-identified] : \par \par

## 2023-03-08 NOTE — PAST MEDICAL HISTORY
[Menarche Age ____] : age at menarche was [unfilled] [Menopause Age____] : age at menopause was [unfilled] [Total Preg ___] : G[unfilled] [FreeTextEntry5] : Hysterectomy at 35 due to severe prolapse

## 2023-03-08 NOTE — ADDENDUM
[FreeTextEntry1] : This note was written by Vanessa Peña, acting as the  for Dr. Spaulding. This note accurately reflects the work and decisions made by Dr. Spaulding.

## 2023-03-16 ENCOUNTER — OUTPATIENT (OUTPATIENT)
Dept: OUTPATIENT SERVICES | Facility: HOSPITAL | Age: 48
LOS: 1 days | End: 2023-03-16
Payer: COMMERCIAL

## 2023-03-16 ENCOUNTER — RESULT REVIEW (OUTPATIENT)
Age: 48
End: 2023-03-16

## 2023-03-16 ENCOUNTER — APPOINTMENT (OUTPATIENT)
Dept: MRI IMAGING | Facility: CLINIC | Age: 48
End: 2023-03-16
Payer: COMMERCIAL

## 2023-03-16 DIAGNOSIS — Z90.710 ACQUIRED ABSENCE OF BOTH CERVIX AND UTERUS: Chronic | ICD-10-CM

## 2023-03-16 DIAGNOSIS — R93.89 ABNORMAL FINDINGS ON DIAGNOSTIC IMAGING OF OTHER SPECIFIED BODY STRUCTURES: ICD-10-CM

## 2023-03-16 DIAGNOSIS — Z12.39 ENCOUNTER FOR OTHER SCREENING FOR MALIGNANT NEOPLASM OF BREAST: ICD-10-CM

## 2023-03-16 PROCEDURE — 77049 MRI BREAST C-+ W/CAD BI: CPT | Mod: 26

## 2023-03-16 PROCEDURE — A9585: CPT

## 2023-03-16 PROCEDURE — C8908: CPT

## 2023-03-16 PROCEDURE — C8937: CPT

## 2023-04-17 ENCOUNTER — OUTPATIENT (OUTPATIENT)
Dept: OUTPATIENT SERVICES | Facility: HOSPITAL | Age: 48
LOS: 1 days | Discharge: HOME | End: 2023-04-17

## 2023-04-17 DIAGNOSIS — D64.9 ANEMIA, UNSPECIFIED: ICD-10-CM

## 2023-04-17 DIAGNOSIS — Z90.710 ACQUIRED ABSENCE OF BOTH CERVIX AND UTERUS: Chronic | ICD-10-CM

## 2023-04-18 ENCOUNTER — APPOINTMENT (OUTPATIENT)
Dept: HEMATOLOGY ONCOLOGY | Facility: CLINIC | Age: 48
End: 2023-04-18

## 2023-05-03 NOTE — H&P PST ADULT - NEGATIVE CARDIOVASCULAR SYMPTOMS
Kayy is a 27 year old year old female here for an OB check.  She is      .  Gestational Age: 37w2d.  Concerns today include: none    She reports fetal movement.   She denies bleeding.  She reports cramping.  She reports nausea.      If your visit includes an exam today, would you like an assistant to be present in the room during that time?yes   no palpitations

## 2023-07-26 NOTE — H&P PST ADULT - AIRWAY
[facility]    PATIENT'S NAME: Odette Montgomery   ATTENDING PHYSICIAN: Mary Mcdaniel MD   OPERATING PHYSICIAN: Tea Camilo. Josef Mcdaniel MD   PATIENT ACCOUNT#:   [id number]    LOCATION:  [facility] [address] PACU 3 EMHP 10  MEDICAL RECORD #:   [id number]       YOB: 1941  ADMISSION DATE:       07/26/2023      OPERATION DATE:  07/26/2023    OPERATIVE REPORT      PREOPERATIVE DIAGNOSIS:  Recurrent spinal stenosis L2-3 with spinal stenosis L3-4, L5 and L4-5 spondylolisthesis. POSTOPERATIVE DIAGNOSIS:  Recurrent spinal stenosis L2-3 with spinal stenosis L3-4, L5 and L4-5 spondylolisthesis. PROCEDURE:    1. Revision laminectomy L2-3 (09876). 2.   L3-4, L5 laminectomy; decompression of L3-4, L5 nerve roots ([id number], [id number]). 3.   Posterolateral fusion L4-5 (41597). 4.   Local bone graft. 5.   Intrathecal morphine and fentanyl block for postoperative analgesia. ASSISTANT:  Yesi Mathews PA-C (medically necessary due to high level of complexity of medical procedure). ANESTHESIA:  General endotracheal.    ESTIMATED BLOOD LOSS:  300 mL. DRAINS:  Hemovac x2 in back. COMPLICATIONS:  None. INDICATIONS:  This is an 82-year-old male with severe neurogenic claudication. MRI reveals severe spinal stenosis at L2-3, L4-5. He has had a previous laminectomy many years ago at L2-3 and has recurrent spinal stenosis at that level. He also has a spondylolisthesis at L4-5. We recommend fusion at the slipped level and decompression L2-5. Risks, benefits, and alternatives were explained with the patient in detail. He appears to understand and has elected to proceed due to the excruciating back and leg pain. OPERATIVE TECHNIQUE:  The patient was given uncomplicated general endotracheal anesthesia and placed prone on the Eloy frame. The right shoulder was actually placed toward his right side rather than in the abducted position due to a previous history of recurrent shoulder dislocations.   It was well padded and secured in place. The back was prepped and draped in usual fashion. All pressure points were well padded. He was given preoperative antibiotics and IV steroids. A 5-inch incision was made in the midline over the low lumbar spine through the previous incision, carried down through subcutaneous tissue to the lumbar fascia which was exposed with electrocautery, swept out to the level of the facet joints. The slipped level was clearly seen. Intraoperative x-ray was taken x2 for correct position of the dissection. Soft tissue was then cleared off the spine from L2 to L5. Posterolateral elements exposed at L4-5 bilaterally. The facet joints were extremely arthritic. The previous laminectomy was noted. A rongeur was used to remove excessive soft tissue from around the previous laminectomy site at L2-3. We cleaned out the scarred paraspinal muscles from L2 to L5. The spinous processes of L3, L4 and the upper aspect of L5 were then removed with a Leksell rongeur. The bone was exceedingly hard. A high-speed bur was then used to thin out the lamina of L3, L4 and the upper aspect of L5 bilaterally as well as widening the previous laminectomy at L2-3. A Kerrison rongeur was used then to perform a laminectomy L3-4, L5. The spinal stenosis at both of these levels was severe, but after removing the bone and doing a partial medial facetectomy at each level and removing the ligamentum flavum, the nerve roots were free and clear. We then worked our way up to L2-3. There was severe scarring around the dura. As we peeled back the ligamentum flavum at this level, a small arachnoid bleb was uncovered. It was exceedingly tiny, but we decided to repair it with a 6-0 Prolene suture. Nerve roots were free and clear at this point L2 to L5. An intrathecal morphine and fentanyl block was performed with a 27-gauge Sprotte needle, 0.5 mg morphine and 25 mcg fentanyl for postoperative analgesia. Posterolateral fusion then performed at L4-5 along the transverse processes and the lateral aspect of the facet joints with local bone graft and DBM. All nerve roots again checked, noted to be free and clear of any bone debris. A deep Hemovac drain x2 was placed. Deep fascia closed with #1 Vicryl suture. Subcutaneous tissue closed with 2-0 PDS. Skin closed with 4-0 Monocryl and Dermabond. A sterile dry dressing was applied. The patient tolerated the procedure well, was extubated and taken to recovery room with stable blood pressure and pulse. There were no complications. SCDs and TEDs used throughout the case. Dictated By Elton Naranjo MD  d: 07/26/2023 17:02:16  t: 07/26/2023 17:21:22  Job [id number]  XLX/ normal

## 2023-08-25 NOTE — REVIEW OF SYSTEMS
Re-ordered prescriptions due to insurance requirement minimums.      Reviewed by:    Drake Martinez DNP, MSN, RN  RN Clinical Coordinator  [facility]    [Negative] : Heme/Lymph [FreeTextEntry8] : Vaginal irritation with white discomfort

## 2023-09-25 ENCOUNTER — OUTPATIENT (OUTPATIENT)
Dept: OUTPATIENT SERVICES | Facility: HOSPITAL | Age: 48
LOS: 1 days | Discharge: HOME | End: 2023-09-25

## 2023-09-25 DIAGNOSIS — D64.9 ANEMIA, UNSPECIFIED: ICD-10-CM

## 2023-09-25 DIAGNOSIS — Z90.710 ACQUIRED ABSENCE OF BOTH CERVIX AND UTERUS: Chronic | ICD-10-CM

## 2023-09-28 ENCOUNTER — RESULT REVIEW (OUTPATIENT)
Age: 48
End: 2023-09-28

## 2023-09-28 ENCOUNTER — OUTPATIENT (OUTPATIENT)
Dept: OUTPATIENT SERVICES | Facility: HOSPITAL | Age: 48
LOS: 1 days | End: 2023-09-28
Payer: COMMERCIAL

## 2023-09-28 ENCOUNTER — APPOINTMENT (OUTPATIENT)
Dept: HEMATOLOGY ONCOLOGY | Facility: CLINIC | Age: 48
End: 2023-09-28

## 2023-09-28 ENCOUNTER — APPOINTMENT (OUTPATIENT)
Dept: MAMMOGRAPHY | Facility: CLINIC | Age: 48
End: 2023-09-28
Payer: COMMERCIAL

## 2023-09-28 ENCOUNTER — APPOINTMENT (OUTPATIENT)
Dept: ULTRASOUND IMAGING | Facility: CLINIC | Age: 48
End: 2023-09-28
Payer: COMMERCIAL

## 2023-09-28 DIAGNOSIS — Z90.710 ACQUIRED ABSENCE OF BOTH CERVIX AND UTERUS: Chronic | ICD-10-CM

## 2023-09-28 DIAGNOSIS — Z12.31 ENCOUNTER FOR SCREENING MAMMOGRAM FOR MALIGNANT NEOPLASM OF BREAST: ICD-10-CM

## 2023-09-28 PROCEDURE — 77067 SCR MAMMO BI INCL CAD: CPT

## 2023-09-28 PROCEDURE — 77063 BREAST TOMOSYNTHESIS BI: CPT | Mod: 26

## 2023-09-28 PROCEDURE — 76641 ULTRASOUND BREAST COMPLETE: CPT | Mod: 26,50

## 2023-09-28 PROCEDURE — 77063 BREAST TOMOSYNTHESIS BI: CPT

## 2023-09-28 PROCEDURE — 77067 SCR MAMMO BI INCL CAD: CPT | Mod: 26

## 2023-09-28 PROCEDURE — 76641 ULTRASOUND BREAST COMPLETE: CPT

## 2023-10-10 ENCOUNTER — NON-APPOINTMENT (OUTPATIENT)
Age: 48
End: 2023-10-10

## 2023-10-30 ENCOUNTER — APPOINTMENT (OUTPATIENT)
Dept: SURGERY | Facility: CLINIC | Age: 48
End: 2023-10-30
Payer: COMMERCIAL

## 2023-10-30 VITALS
OXYGEN SATURATION: 98 % | HEIGHT: 64 IN | HEART RATE: 81 BPM | DIASTOLIC BLOOD PRESSURE: 71 MMHG | BODY MASS INDEX: 22.53 KG/M2 | SYSTOLIC BLOOD PRESSURE: 107 MMHG | WEIGHT: 132 LBS

## 2023-10-30 DIAGNOSIS — D24.2 BENIGN NEOPLASM OF LEFT BREAST: ICD-10-CM

## 2023-10-30 DIAGNOSIS — N64.89 OTHER SPECIFIED DISORDERS OF BREAST: ICD-10-CM

## 2023-10-30 PROCEDURE — 99213 OFFICE O/P EST LOW 20 MIN: CPT

## 2023-11-28 ENCOUNTER — NON-APPOINTMENT (OUTPATIENT)
Age: 48
End: 2023-11-28

## 2024-03-03 ENCOUNTER — NON-APPOINTMENT (OUTPATIENT)
Age: 49
End: 2024-03-03

## 2024-03-11 ENCOUNTER — OFFICE (OUTPATIENT)
Dept: URBAN - METROPOLITAN AREA CLINIC 113 | Facility: CLINIC | Age: 49
Setting detail: OPHTHALMOLOGY
End: 2024-03-11
Payer: COMMERCIAL

## 2024-03-11 DIAGNOSIS — H16.223: ICD-10-CM

## 2024-03-11 DIAGNOSIS — H52.211: ICD-10-CM

## 2024-03-11 DIAGNOSIS — H52.4: ICD-10-CM

## 2024-03-11 DIAGNOSIS — H40.013: ICD-10-CM

## 2024-03-11 DIAGNOSIS — H18.523: ICD-10-CM

## 2024-03-11 PROCEDURE — 83861 MICROFLUID ANALY TEARS: CPT | Mod: QW | Performed by: OPTOMETRIST

## 2024-03-11 PROCEDURE — 92014 COMPRE OPH EXAM EST PT 1/>: CPT | Performed by: OPTOMETRIST

## 2024-03-11 PROCEDURE — 92015 DETERMINE REFRACTIVE STATE: CPT | Performed by: OPTOMETRIST

## 2024-03-11 PROCEDURE — 92133 CPTRZD OPH DX IMG PST SGM ON: CPT | Performed by: OPTOMETRIST

## 2024-03-11 ASSESSMENT — SPHEQUIV_DERIVED
OD_SPHEQUIV: -3.5
OS_SPHEQUIV: -0.5
OD_SPHEQUIV: -3
OS_SPHEQUIV: -0.5

## 2024-03-11 ASSESSMENT — REFRACTION_CURRENTRX
OD_SPHERE: -1.00
OS_SPHERE: -0.25
OS_OVR_VA: 20/
OD_CYLINDER: -1.50
OD_CYLINDER: -3.00
OD_OVR_VA: 20/
OS_VPRISM_DIRECTION: PROGS
OS_OVR_VA: 20/
OD_AXIS: 177
OD_VPRISM_DIRECTION: PROGS
OS_SPHERE: -0.25
OS_ADD: +1.00
OS_AXIS: 0
OS_OVR_VA: 20/
OS_AXIS: 180
OS_VPRISM_DIRECTION: SV
OD_AXIS: 172
OD_VPRISM_DIRECTION: SV
OS_CYLINDER: -0.50
OS_CYLINDER: -0.50
OD_SPHERE: -1.00
OD_ADD: +1.00
OD_OVR_VA: 20/
OD_OVR_VA: 20/

## 2024-03-11 ASSESSMENT — REFRACTION_MANIFEST
OD_AXIS: 170
OS_CYLINDER: -0.50
OS_SPHERE: -0.25
OD_AXIS: 170
OS_ADD: +1.50
OD_VA1: 20/25
OS_SPHERE: -0.25
OS_VA1: 20/20
OD_CYLINDER: -3.00
OD_VA1: 20/25
OS_AXIS: 005
OD_CYLINDER: -3.00
OS_CYLINDER: -0.50
OD_SPHERE: -1.50
OS_ADD: +1.50
OD_ADD: +1.50
OS_AXIS: 005
OS_VA1: 20/20
OD_SPHERE: -2.00
OD_ADD: +1.50

## 2024-03-18 ENCOUNTER — APPOINTMENT (OUTPATIENT)
Dept: MRI IMAGING | Facility: CLINIC | Age: 49
End: 2024-03-18
Payer: COMMERCIAL

## 2024-03-18 ENCOUNTER — OUTPATIENT (OUTPATIENT)
Dept: OUTPATIENT SERVICES | Facility: HOSPITAL | Age: 49
LOS: 1 days | End: 2024-03-18
Payer: COMMERCIAL

## 2024-03-18 DIAGNOSIS — Z90.710 ACQUIRED ABSENCE OF BOTH CERVIX AND UTERUS: Chronic | ICD-10-CM

## 2024-03-18 DIAGNOSIS — Z12.39 ENCOUNTER FOR OTHER SCREENING FOR MALIGNANT NEOPLASM OF BREAST: ICD-10-CM

## 2024-03-18 PROCEDURE — C8908: CPT

## 2024-03-18 PROCEDURE — C8937: CPT

## 2024-03-18 PROCEDURE — 77049 MRI BREAST C-+ W/CAD BI: CPT | Mod: 26

## 2024-03-19 ENCOUNTER — NON-APPOINTMENT (OUTPATIENT)
Age: 49
End: 2024-03-19

## 2024-04-23 ENCOUNTER — APPOINTMENT (OUTPATIENT)
Dept: OBGYN | Facility: CLINIC | Age: 49
End: 2024-04-23
Payer: COMMERCIAL

## 2024-04-23 VITALS
SYSTOLIC BLOOD PRESSURE: 110 MMHG | HEIGHT: 64 IN | DIASTOLIC BLOOD PRESSURE: 70 MMHG | BODY MASS INDEX: 22.53 KG/M2 | WEIGHT: 132 LBS

## 2024-04-23 DIAGNOSIS — Z01.419 ENCOUNTER FOR GYNECOLOGICAL EXAMINATION (GENERAL) (ROUTINE) W/OUT ABNORMAL FINDINGS: ICD-10-CM

## 2024-04-23 PROCEDURE — 99396 PREV VISIT EST AGE 40-64: CPT

## 2024-04-23 NOTE — HISTORY OF PRESENT ILLNESS
[FreeTextEntry1] : 49-year-old female  presenting office for annual well woman appointment.  She has no complaints and no change in her history. h/o total hysterectomy-prolapse  Obstetrical history of 3 prior vaginal deliveries with a postdelivery complication leading to a vaginal hysterectomy at 3 months postpartum.  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  Denies medical history.  Surgical history significant for previously mentioned vaginal hysterectomy and cervical spinal fusion surgery.  Family history of a maternal aunt and maternal cousin both with breast cancer.  She is BRCA negative but is vigilant with her self breast exams.  She has a history of dense breast tissue with fibrocystic change.  She underwent a MRI breast biopsy with Lesli Pena M.D., which resulted benign, PASH . Recent MRI neg. Denies alcohol, tobacco, illicit drug use.  She reports an allergy to sumatriptan.

## 2024-04-23 NOTE — PHYSICAL EXAM
[Appropriately responsive] : appropriately responsive [Alert] : alert [No Acute Distress] : no acute distress [Soft] : soft [Non-tender] : non-tender [Non-distended] : non-distended [No HSM] : No HSM [No Lesions] : no lesions [No Mass] : no mass [Oriented x3] : oriented x3 [Examination Of The Breasts] : a normal appearance [Breast Palpation Diffuse Fibrous Tissue Bilateral] : fibrocystic changes [No Masses] : no breast masses were palpable [Labia Majora] : normal [Labia Minora] : normal [Normal] : normal [Absent] : absent [Uterine Adnexae] : normal

## 2024-04-29 ENCOUNTER — APPOINTMENT (OUTPATIENT)
Dept: SURGERY | Facility: CLINIC | Age: 49
End: 2024-04-29
Payer: COMMERCIAL

## 2024-04-29 VITALS
OXYGEN SATURATION: 100 % | HEIGHT: 64 IN | WEIGHT: 134 LBS | SYSTOLIC BLOOD PRESSURE: 122 MMHG | DIASTOLIC BLOOD PRESSURE: 77 MMHG | BODY MASS INDEX: 22.88 KG/M2 | HEART RATE: 76 BPM

## 2024-04-29 DIAGNOSIS — Z12.39 ENCOUNTER FOR OTHER SCREENING FOR MALIGNANT NEOPLASM OF BREAST: ICD-10-CM

## 2024-04-29 PROCEDURE — 99213 OFFICE O/P EST LOW 20 MIN: CPT

## 2024-04-29 NOTE — ASSESSMENT
[FreeTextEntry1] : 49 year old female with history of left breast MRI biopsy revealing PASH and fibroadenoma, with family history of breast cancer and elevated Tyrer cuzick score, 33.5%, undergoing high-risk screening.   CBE is benign. We reviewed her recent MRI, which is also benign. She is due for annual mammogram/US in Sept. '24. We will see her back in 6 months for CBE.   Patient verbalized understanding of all treatment plans. All of her questions were answered to the best of my ability. She was encouraged to call the office sooner for any questions or concerns.   Plan 1. Mammogram/US Sept '24 2. Follow up in 6 months  3. MRI breast March 2025

## 2024-04-29 NOTE — HISTORY OF PRESENT ILLNESS
[FreeTextEntry1] : Problem List: 1.Left breast fibroadenoma/PASH 2. Family history of breast cancer      -BRCA testing over 15 years ago, negative    -[employer] 19 gene-panel testing negative for pathogenic mutations  3. High-risk for breast cancer, Bhavani 33%  CARE TEAM PCP - Bautista Quevedo   INTERVAL HISTORY: (03/08/23): Patient returns for followup after prior abnormal breast imaging in December 2021, with recommendations for repeat MRI, for which she is overdue. She denies breast complaints.   (10/30/23): Patient presents for follow up visit. No breast complaints.   (04/29/24): Patient presents for 6 month follow up. Denies breast complaints. Extended panel genetic testing was performed and negative for pathogenic mutations.     HPI:  Patient initially presented to Dr. Pena in October 2021 for local breast skin reaction. This was being followed by a dermatologist and has improved. However, she underwent MRI for further eval which demonstrated a left breast enhancement for which biopsy was recommended. Imaging revealing fibroadenoma and PASH.    IMAGING 08/23/22 - [facility]: Bilateral Screening Mammogram - IMPRESSION: No mammographic evidence of malignancy. Breast sonography has been performed and will be reported separately. - BIRADS 2: Benign Finding(s).  08/23/22 - [facility]: Complete Bilateral Breast Ultrasound - IMPRESSION: Right breast nodule with benign features, stable. No suspicious findings. - BIRADS 2: Benign Finding(s).  [facility] Impression - No MRI evidence of malignancy. Status post benign left MRI guided biopsy. No suspicious changes. Resume annual mammography on schedule. BI-RADS 2   [facility] B[facility] and Ultrasound Breast Complete: Density D, Impression - No mammographic or sonographic evidence of malignancy. BI-RADS 2   [facility]: Impression - No MRI evidence of malignancy. BI-RADS 2

## 2024-04-29 NOTE — PHYSICAL EXAM
[Normocephalic] : normocephalic [Atraumatic] : atraumatic [EOMI] : extra ocular movement intact [PERRL] : pupils equal, round and reactive to light [Sclera nonicteric] : sclera nonicteric [Supple] : supple [No Supraclavicular Adenopathy] : no supraclavicular adenopathy [No Cervical Adenopathy] : no cervical adenopathy [Examined in the supine and seated position] : examined in the supine and seated position [Bra Size: ___] : Bra Size: [unfilled] [No dominant masses] : no dominant masses in right breast  [No dominant masses] : no dominant masses left breast [No Nipple Retraction] : no left nipple retraction [No Nipple Discharge] : no left nipple discharge [Breast Nipple Inversion] : nipples not inverted [Breast Nipple Retraction] : nipples not retracted [Breast Nipple Flattening] : nipples not flattened [Breast Nipple Fissures] : nipples not fissured [Breast Abnormal Lactation (Galactorrhea)] : no galactorrhea [Breast Abnormal Secretion Bloody Fluid] : no bloody discharge [Breast Abnormal Secretion Serous Fluid] : no serous discharge [Breast Abnormal Secretion Opalescent Fluid] : no milky discharge [No Axillary Lymphadenopathy] : no left axillary lymphadenopathy [No Edema] : no edema [No Rashes] : no rashes [No Ulceration] : no ulceration [de-identified] : Dense breast tissue, more prominent UOQ  [de-identified] : \par  \par

## 2024-10-03 ENCOUNTER — RESULT REVIEW (OUTPATIENT)
Age: 49
End: 2024-10-03

## 2024-10-03 ENCOUNTER — APPOINTMENT (OUTPATIENT)
Dept: MAMMOGRAPHY | Facility: CLINIC | Age: 49
End: 2024-10-03
Payer: COMMERCIAL

## 2024-10-03 ENCOUNTER — APPOINTMENT (OUTPATIENT)
Dept: ULTRASOUND IMAGING | Facility: CLINIC | Age: 49
End: 2024-10-03
Payer: COMMERCIAL

## 2024-10-03 PROCEDURE — 77067 SCR MAMMO BI INCL CAD: CPT | Mod: 26

## 2024-10-03 PROCEDURE — 76641 ULTRASOUND BREAST COMPLETE: CPT | Mod: 26,50

## 2024-10-03 PROCEDURE — 77063 BREAST TOMOSYNTHESIS BI: CPT | Mod: 26

## 2024-12-03 PROBLEM — Z80.3 FAMILY HISTORY OF BREAST CANCER: Status: ACTIVE | Noted: 2024-12-03

## 2024-12-04 ENCOUNTER — APPOINTMENT (OUTPATIENT)
Facility: CLINIC | Age: 49
End: 2024-12-04
Payer: COMMERCIAL

## 2024-12-04 VITALS
SYSTOLIC BLOOD PRESSURE: 118 MMHG | WEIGHT: 135 LBS | DIASTOLIC BLOOD PRESSURE: 80 MMHG | BODY MASS INDEX: 23.05 KG/M2 | HEIGHT: 64 IN

## 2024-12-04 DIAGNOSIS — Z12.39 ENCOUNTER FOR OTHER SCREENING FOR MALIGNANT NEOPLASM OF BREAST: ICD-10-CM

## 2024-12-04 DIAGNOSIS — N64.89 OTHER SPECIFIED DISORDERS OF BREAST: ICD-10-CM

## 2024-12-04 DIAGNOSIS — R92.30 DENSE BREASTS, UNSPECIFIED: ICD-10-CM

## 2024-12-04 DIAGNOSIS — D24.2 BENIGN NEOPLASM OF LEFT BREAST: ICD-10-CM

## 2024-12-04 DIAGNOSIS — Z80.3 FAMILY HISTORY OF MALIGNANT NEOPLASM OF BREAST: ICD-10-CM

## 2024-12-04 PROCEDURE — 99213 OFFICE O/P EST LOW 20 MIN: CPT

## 2025-02-02 ENCOUNTER — NON-APPOINTMENT (OUTPATIENT)
Age: 50
End: 2025-02-02

## 2025-02-11 ENCOUNTER — APPOINTMENT (OUTPATIENT)
Dept: OBGYN | Facility: CLINIC | Age: 50
End: 2025-02-11
Payer: COMMERCIAL

## 2025-02-11 VITALS
SYSTOLIC BLOOD PRESSURE: 180 MMHG | HEIGHT: 64 IN | DIASTOLIC BLOOD PRESSURE: 20 MMHG | BODY MASS INDEX: 23.05 KG/M2 | WEIGHT: 135 LBS

## 2025-02-11 DIAGNOSIS — R23.2 FLUSHING: ICD-10-CM

## 2025-02-11 DIAGNOSIS — R14.0 ABDOMINAL DISTENSION (GASEOUS): ICD-10-CM

## 2025-02-11 PROCEDURE — 99214 OFFICE O/P EST MOD 30 MIN: CPT

## 2025-02-17 LAB
ESTRADIOL SERPL-MCNC: 35 PG/ML
FSH SERPL-MCNC: 11 IU/L

## 2025-02-21 ENCOUNTER — APPOINTMENT (OUTPATIENT)
Dept: ULTRASOUND IMAGING | Facility: CLINIC | Age: 50
End: 2025-02-21
Payer: COMMERCIAL

## 2025-02-21 ENCOUNTER — OUTPATIENT (OUTPATIENT)
Dept: OUTPATIENT SERVICES | Facility: HOSPITAL | Age: 50
LOS: 1 days | End: 2025-02-21
Payer: COMMERCIAL

## 2025-02-21 ENCOUNTER — APPOINTMENT (OUTPATIENT)
Dept: MRI IMAGING | Facility: CLINIC | Age: 50
End: 2025-02-21
Payer: COMMERCIAL

## 2025-02-21 DIAGNOSIS — Z90.710 ACQUIRED ABSENCE OF BOTH CERVIX AND UTERUS: Chronic | ICD-10-CM

## 2025-02-21 DIAGNOSIS — Z12.39 ENCOUNTER FOR OTHER SCREENING FOR MALIGNANT NEOPLASM OF BREAST: ICD-10-CM

## 2025-02-21 PROCEDURE — C8937: CPT

## 2025-02-21 PROCEDURE — C8908: CPT

## 2025-02-21 PROCEDURE — 77049 MRI BREAST C-+ W/CAD BI: CPT | Mod: 26

## 2025-02-21 PROCEDURE — 76830 TRANSVAGINAL US NON-OB: CPT | Mod: 26

## 2025-02-21 PROCEDURE — 76830 TRANSVAGINAL US NON-OB: CPT

## 2025-02-21 PROCEDURE — A9585: CPT

## 2025-02-27 DIAGNOSIS — N83.292 OTHER OVARIAN CYST, LEFT SIDE: ICD-10-CM

## 2025-04-30 ENCOUNTER — NON-APPOINTMENT (OUTPATIENT)
Age: 50
End: 2025-04-30

## 2025-05-09 ENCOUNTER — NON-APPOINTMENT (OUTPATIENT)
Age: 50
End: 2025-05-09

## 2025-06-10 NOTE — PATIENT PROFILE ADULT - NSPROGENANESREACTION_GEN_A_NUR
1st attempt no message regarding smoking cessation quit 1 episode mailbox full.     no previous reaction

## 2025-06-16 ENCOUNTER — NON-APPOINTMENT (OUTPATIENT)
Age: 50
End: 2025-06-16

## 2025-06-18 ENCOUNTER — APPOINTMENT (OUTPATIENT)
Facility: CLINIC | Age: 50
End: 2025-06-18
Payer: COMMERCIAL

## 2025-06-18 VITALS
WEIGHT: 130 LBS | OXYGEN SATURATION: 98 % | SYSTOLIC BLOOD PRESSURE: 122 MMHG | BODY MASS INDEX: 22.2 KG/M2 | DIASTOLIC BLOOD PRESSURE: 66 MMHG | HEIGHT: 64 IN | HEART RATE: 99 BPM

## 2025-06-18 PROCEDURE — 99214 OFFICE O/P EST MOD 30 MIN: CPT

## 2025-06-18 PROCEDURE — G2211 COMPLEX E/M VISIT ADD ON: CPT | Mod: NC

## 2025-07-01 ENCOUNTER — APPOINTMENT (OUTPATIENT)
Dept: MAMMOGRAPHY | Facility: CLINIC | Age: 50
End: 2025-07-01
Payer: COMMERCIAL

## 2025-07-01 ENCOUNTER — OUTPATIENT (OUTPATIENT)
Dept: OUTPATIENT SERVICES | Facility: HOSPITAL | Age: 50
LOS: 1 days | End: 2025-07-01
Payer: COMMERCIAL

## 2025-07-01 ENCOUNTER — RESULT REVIEW (OUTPATIENT)
Age: 50
End: 2025-07-01

## 2025-07-01 ENCOUNTER — NON-APPOINTMENT (OUTPATIENT)
Age: 50
End: 2025-07-01

## 2025-07-01 ENCOUNTER — APPOINTMENT (OUTPATIENT)
Dept: ULTRASOUND IMAGING | Facility: CLINIC | Age: 50
End: 2025-07-01
Payer: COMMERCIAL

## 2025-07-01 DIAGNOSIS — Z90.710 ACQUIRED ABSENCE OF BOTH CERVIX AND UTERUS: Chronic | ICD-10-CM

## 2025-07-01 DIAGNOSIS — N63.10 UNSPECIFIED LUMP IN THE RIGHT BREAST, UNSPECIFIED QUADRANT: ICD-10-CM

## 2025-07-01 DIAGNOSIS — Z00.8 ENCOUNTER FOR OTHER GENERAL EXAMINATION: ICD-10-CM

## 2025-07-01 PROCEDURE — G0279: CPT | Mod: 26

## 2025-07-01 PROCEDURE — 76642 ULTRASOUND BREAST LIMITED: CPT | Mod: 26,RT

## 2025-07-01 PROCEDURE — 76642 ULTRASOUND BREAST LIMITED: CPT

## 2025-07-01 PROCEDURE — G0279: CPT

## 2025-07-01 PROCEDURE — 77065 DX MAMMO INCL CAD UNI: CPT

## 2025-07-01 PROCEDURE — 77065 DX MAMMO INCL CAD UNI: CPT | Mod: 26,RT

## 2025-08-11 ENCOUNTER — OFFICE (OUTPATIENT)
Dept: URBAN - METROPOLITAN AREA CLINIC 113 | Facility: CLINIC | Age: 50
Setting detail: OPHTHALMOLOGY
End: 2025-08-11
Payer: COMMERCIAL

## 2025-08-11 DIAGNOSIS — H40.013: ICD-10-CM

## 2025-08-11 DIAGNOSIS — H52.4: ICD-10-CM

## 2025-08-11 DIAGNOSIS — H52.223: ICD-10-CM

## 2025-08-11 DIAGNOSIS — H18.523: ICD-10-CM

## 2025-08-11 PROCEDURE — 92133 CPTRZD OPH DX IMG PST SGM ON: CPT | Performed by: OPTOMETRIST

## 2025-08-11 PROCEDURE — 92014 COMPRE OPH EXAM EST PT 1/>: CPT | Performed by: OPTOMETRIST

## 2025-08-11 PROCEDURE — 92083 EXTENDED VISUAL FIELD XM: CPT | Performed by: OPTOMETRIST

## 2025-08-11 PROCEDURE — 92015 DETERMINE REFRACTIVE STATE: CPT | Performed by: OPTOMETRIST

## 2025-08-11 ASSESSMENT — REFRACTION_AUTOREFRACTION
OS_SPHERE: -0.50
OD_CYLINDER: -2.50
OS_AXIS: 003
OD_SPHERE: -3.00
OD_AXIS: 174
OS_CYLINDER: -0.75

## 2025-08-11 ASSESSMENT — VISUAL ACUITY
OD_BCVA: 20/20
OS_BCVA: 20/150

## 2025-08-11 ASSESSMENT — KERATOMETRY
OD_AXISANGLE_DEGREES: 080
OS_K1POWER_DIOPTERS: 43.50
OS_K2POWER_DIOPTERS: 44.75
OD_K2POWER_DIOPTERS: 46.50
OD_K1POWER_DIOPTERS: 42.75
OS_AXISANGLE_DEGREES: 099

## 2025-08-11 ASSESSMENT — REFRACTION_MANIFEST
OS_AXIS: 005
OD_SPHERE: -2.50
OS_AXIS: 005
OD_VA1: 20/30
OD_ADD: +1.50
OS_SPHERE: -0.25
OS_ADD: +1.50
OD_ADD: +2.00
OS_ADD: +2.00
OD_CYLINDER: -2.50
OD_AXIS: 170
OS_VA1: 20/20
OD_AXIS: 173
OD_SPHERE: -1.50
OS_CYLINDER: -0.50
OD_VA1: 20/25
OS_VA1: 20/20
OD_CYLINDER: -3.00
OS_CYLINDER: -0.50
OS_SPHERE: -0.25

## 2025-08-11 ASSESSMENT — PACHYMETRY
OS_CT_UM: 557
OD_CT_CORRECTION: -3
OD_CT_UM: 585
OS_CT_CORRECTION: -1

## 2025-08-11 ASSESSMENT — REFRACTION_CURRENTRX
OS_SPHERE: PLANO
OD_ADD: +1.00
OD_VPRISM_DIRECTION: PROGS
OS_CYLINDER: -0.75
OD_SPHERE: -1.00
OD_AXIS: 177
OS_AXIS: 180
OS_ADD: +1.00
OS_AXIS: 0
OD_CYLINDER: -3.00
OD_AXIS: 172
OD_SPHERE: -1.25
OD_OVR_VA: 20/
OS_ADD: +1.00
OD_CYLINDER: -1.50
OD_ADD: +1.00
OD_OVR_VA: 20/
OD_VPRISM_DIRECTION: PROGS
OS_OVR_VA: 20/
OS_OVR_VA: 20/
OD_VPRISM_DIRECTION: SV
OD_SPHERE: -1.00
OD_CYLINDER: -3.00
OD_OVR_VA: 20/
OS_VPRISM_DIRECTION: PROGS
OD_AXIS: 173
OS_AXIS: 176
OS_CYLINDER: -0.50
OS_SPHERE: -0.25
OS_VPRISM_DIRECTION: PROGS
OS_OVR_VA: 20/
OS_SPHERE: -0.25
OS_VPRISM_DIRECTION: SV
OS_CYLINDER: -0.50

## 2025-08-11 ASSESSMENT — CONFRONTATIONAL VISUAL FIELD TEST (CVF)
OD_FINDINGS: FULL
OS_FINDINGS: FULL

## 2025-08-11 ASSESSMENT — TONOMETRY
OD_IOP_MMHG: 15
OS_IOP_MMHG: 15

## 2025-08-11 ASSESSMENT — CORNEAL SURGICAL SCARRING: OD_SCARRING: CENTRAL

## 2025-08-11 ASSESSMENT — SUPERFICIAL PUNCTATE KERATITIS (SPK)
OD_SPK: ABSENT
OS_SPK: ABSENT

## 2025-08-12 ENCOUNTER — OFFICE (OUTPATIENT)
Dept: URBAN - METROPOLITAN AREA CLINIC 41 | Facility: CLINIC | Age: 50
Setting detail: OPHTHALMOLOGY
End: 2025-08-12
Payer: COMMERCIAL

## 2025-08-12 DIAGNOSIS — H40.013: ICD-10-CM

## 2025-08-12 PROBLEM — H52.223 ASTIGMATISM; BOTH EYES: Status: ACTIVE | Noted: 2025-08-11

## 2025-08-12 PROCEDURE — N/C NO CHARGE: Performed by: OPTOMETRIST

## 2025-08-12 ASSESSMENT — KERATOMETRY
OS_K1POWER_DIOPTERS: 43.50
OS_K2POWER_DIOPTERS: 44.75
OS_AXISANGLE_DEGREES: 099
OD_K1POWER_DIOPTERS: 42.75
OD_K2POWER_DIOPTERS: 46.50
OD_AXISANGLE_DEGREES: 080

## 2025-08-12 ASSESSMENT — REFRACTION_AUTOREFRACTION
OS_CYLINDER: -0.75
OD_CYLINDER: -2.50
OS_SPHERE: -0.50
OD_SPHERE: -3.00
OD_AXIS: 174
OS_AXIS: 003

## 2025-08-12 ASSESSMENT — REFRACTION_CURRENTRX
OD_OVR_VA: 20/
OD_AXIS: 177
OS_AXIS: 180
OD_ADD: +1.00
OS_AXIS: 176
OS_CYLINDER: -0.50
OD_VPRISM_DIRECTION: SV
OS_VPRISM_DIRECTION: PROGS
OD_SPHERE: -1.25
OS_CYLINDER: -0.75
OS_ADD: +1.00
OS_SPHERE: -0.25
OD_CYLINDER: -3.00
OS_SPHERE: -0.25
OD_AXIS: 172
OS_OVR_VA: 20/
OS_SPHERE: PLANO
OS_VPRISM_DIRECTION: PROGS
OD_CYLINDER: -1.50
OS_ADD: +1.00
OD_CYLINDER: -3.00
OD_VPRISM_DIRECTION: PROGS
OD_VPRISM_DIRECTION: PROGS
OS_OVR_VA: 20/
OD_ADD: +1.00
OD_AXIS: 173
OS_VPRISM_DIRECTION: SV
OD_OVR_VA: 20/
OD_SPHERE: -1.00
OS_AXIS: 0
OS_CYLINDER: -0.50
OS_OVR_VA: 20/
OD_OVR_VA: 20/
OD_SPHERE: -1.00

## 2025-08-12 ASSESSMENT — REFRACTION_MANIFEST
OD_SPHERE: -1.50
OS_SPHERE: -0.25
OS_CYLINDER: -0.50
OD_ADD: +2.00
OD_ADD: +1.50
OD_SPHERE: -2.50
OS_AXIS: 005
OD_VA1: 20/25
OS_VA1: 20/20
OD_VA1: 20/30
OS_ADD: +2.00
OD_CYLINDER: -3.00
OS_SPHERE: -0.25
OS_ADD: +1.50
OS_CYLINDER: -0.50
OD_AXIS: 173
OS_VA1: 20/20
OD_AXIS: 170
OD_CYLINDER: -2.50
OS_AXIS: 005

## 2025-08-12 ASSESSMENT — VISUAL ACUITY
OD_BCVA: 20/20
OS_BCVA: 20/150